# Patient Record
Sex: MALE | Race: WHITE | NOT HISPANIC OR LATINO | Employment: UNEMPLOYED | URBAN - NONMETROPOLITAN AREA
[De-identification: names, ages, dates, MRNs, and addresses within clinical notes are randomized per-mention and may not be internally consistent; named-entity substitution may affect disease eponyms.]

---

## 2023-06-13 PROBLEM — E55.9 VITAMIN D DEFICIENCY: Status: ACTIVE | Noted: 2023-06-13

## 2023-06-13 PROBLEM — G47.33 OSA (OBSTRUCTIVE SLEEP APNEA): Status: ACTIVE | Noted: 2023-06-13

## 2023-06-13 PROBLEM — I10 HYPERTENSION: Status: ACTIVE | Noted: 2023-06-13

## 2023-06-13 PROBLEM — E78.00 HYPERCHOLESTEREMIA: Status: ACTIVE | Noted: 2023-06-13

## 2023-06-13 PROBLEM — J44.1 COPD WITH EXACERBATION (MULTI): Status: ACTIVE | Noted: 2023-06-13

## 2023-06-13 PROBLEM — J84.10 PULMONARY FIBROSIS (MULTI): Status: ACTIVE | Noted: 2023-06-13

## 2023-06-13 PROBLEM — H40.9 GLAUCOMA: Status: ACTIVE | Noted: 2023-06-13

## 2023-06-13 RX ORDER — IPRATROPIUM BROMIDE AND ALBUTEROL SULFATE 2.5; .5 MG/3ML; MG/3ML
SOLUTION RESPIRATORY (INHALATION)
COMMUNITY
Start: 2019-06-03 | End: 2023-09-23 | Stop reason: SDUPTHER

## 2023-06-13 RX ORDER — ATORVASTATIN CALCIUM 80 MG/1
1 TABLET, FILM COATED ORAL DAILY
COMMUNITY
Start: 2016-10-13 | End: 2023-06-29 | Stop reason: SDUPTHER

## 2023-06-13 RX ORDER — ASPIRIN 81 MG/1
1 TABLET ORAL DAILY
COMMUNITY
Start: 2017-01-16

## 2023-06-13 RX ORDER — FLUTICASONE FUROATE AND VILANTEROL 100; 25 UG/1; UG/1
POWDER RESPIRATORY (INHALATION)
COMMUNITY
Start: 2019-06-03 | End: 2023-10-16 | Stop reason: WASHOUT

## 2023-06-13 RX ORDER — DORZOLAMIDE HYDROCHLORIDE AND TIMOLOL MALEATE 20; 5 MG/ML; MG/ML
SOLUTION/ DROPS OPHTHALMIC
COMMUNITY
Start: 2021-06-12

## 2023-06-13 RX ORDER — LISINOPRIL 10 MG/1
1 TABLET ORAL DAILY
COMMUNITY
Start: 2016-06-14 | End: 2023-06-29 | Stop reason: SDUPTHER

## 2023-06-13 RX ORDER — BISACODYL 5 MG
TABLET, DELAYED RELEASE (ENTERIC COATED) ORAL
COMMUNITY
Start: 2022-01-21 | End: 2023-09-23 | Stop reason: ALTCHOICE

## 2023-06-13 RX ORDER — MULTIVITAMIN
TABLET ORAL
COMMUNITY
Start: 2017-01-16

## 2023-06-13 RX ORDER — BRIMONIDINE TARTRATE AND TIMOLOL MALEATE 2; 5 MG/ML; MG/ML
SOLUTION OPHTHALMIC 2 TIMES DAILY
COMMUNITY
Start: 2020-10-28 | End: 2023-10-16 | Stop reason: WASHOUT

## 2023-06-13 RX ORDER — POLYETHYLENE GLYCOL 3350 17 G/17G
POWDER, FOR SOLUTION ORAL
COMMUNITY
Start: 2022-01-21 | End: 2023-10-16 | Stop reason: WASHOUT

## 2023-06-13 RX ORDER — ALBUTEROL SULFATE 90 UG/1
AEROSOL, METERED RESPIRATORY (INHALATION)
COMMUNITY
Start: 2021-07-22

## 2023-06-29 DIAGNOSIS — E78.00 HYPERCHOLESTEREMIA: ICD-10-CM

## 2023-06-29 DIAGNOSIS — I10 HYPERTENSION, UNSPECIFIED TYPE: ICD-10-CM

## 2023-06-29 RX ORDER — LISINOPRIL 10 MG/1
10 TABLET ORAL DAILY
Qty: 30 TABLET | Refills: 0 | Status: SHIPPED | OUTPATIENT
Start: 2023-06-29 | End: 2023-07-12 | Stop reason: SDUPTHER

## 2023-06-29 RX ORDER — ATORVASTATIN CALCIUM 80 MG/1
80 TABLET, FILM COATED ORAL DAILY
Qty: 30 TABLET | Refills: 0 | Status: SHIPPED | OUTPATIENT
Start: 2023-06-29 | End: 2023-07-12 | Stop reason: SDUPTHER

## 2023-07-12 ENCOUNTER — OFFICE VISIT (OUTPATIENT)
Dept: PRIMARY CARE | Facility: CLINIC | Age: 68
End: 2023-07-12
Payer: MEDICARE

## 2023-07-12 VITALS
OXYGEN SATURATION: 95 % | WEIGHT: 186.6 LBS | DIASTOLIC BLOOD PRESSURE: 90 MMHG | SYSTOLIC BLOOD PRESSURE: 128 MMHG | HEART RATE: 65 BPM | TEMPERATURE: 97.2 F | BODY MASS INDEX: 32.03 KG/M2

## 2023-07-12 DIAGNOSIS — I70.0 ATHEROSCLEROSIS OF AORTA (CMS-HCC): ICD-10-CM

## 2023-07-12 DIAGNOSIS — J44.1 COPD WITH EXACERBATION (MULTI): ICD-10-CM

## 2023-07-12 DIAGNOSIS — E78.00 HYPERCHOLESTEREMIA: ICD-10-CM

## 2023-07-12 DIAGNOSIS — R91.8 LUNG MASS: ICD-10-CM

## 2023-07-12 DIAGNOSIS — I10 HYPERTENSION, UNSPECIFIED TYPE: Primary | ICD-10-CM

## 2023-07-12 PROCEDURE — 1160F RVW MEDS BY RX/DR IN RCRD: CPT | Performed by: INTERNAL MEDICINE

## 2023-07-12 PROCEDURE — 99214 OFFICE O/P EST MOD 30 MIN: CPT | Performed by: INTERNAL MEDICINE

## 2023-07-12 PROCEDURE — 1159F MED LIST DOCD IN RCRD: CPT | Performed by: INTERNAL MEDICINE

## 2023-07-12 PROCEDURE — 3074F SYST BP LT 130 MM HG: CPT | Performed by: INTERNAL MEDICINE

## 2023-07-12 PROCEDURE — 3080F DIAST BP >= 90 MM HG: CPT | Performed by: INTERNAL MEDICINE

## 2023-07-12 RX ORDER — LISINOPRIL 10 MG/1
10 TABLET ORAL DAILY
Qty: 90 TABLET | Refills: 1 | Status: SHIPPED | OUTPATIENT
Start: 2023-07-12 | End: 2023-10-16 | Stop reason: SDUPTHER

## 2023-07-12 RX ORDER — ATORVASTATIN CALCIUM 80 MG/1
80 TABLET, FILM COATED ORAL DAILY
Qty: 90 TABLET | Refills: 1 | Status: SHIPPED | OUTPATIENT
Start: 2023-07-12 | End: 2023-10-16 | Stop reason: SDUPTHER

## 2023-07-12 ASSESSMENT — ENCOUNTER SYMPTOMS
ARTHRALGIAS: 0
HYPERTENSION: 1
DIFFICULTY URINATING: 0
ABDOMINAL PAIN: 0
HEADACHES: 0
FEVER: 0
COUGH: 0
SINUS PAIN: 0
PALPITATIONS: 0
BLOOD IN STOOL: 0
SORE THROAT: 0
FATIGUE: 0
WHEEZING: 0
DIARRHEA: 0
DIZZINESS: 0
SHORTNESS OF BREATH: 1
UNEXPECTED WEIGHT CHANGE: 0
BRUISES/BLEEDS EASILY: 0

## 2023-07-12 NOTE — PROGRESS NOTES
Subjective   Patient ID: Ozzy Hernández is a 67 y.o. male who presents for COPD, Hyperlipidemia, Hypertension, Results (CT and PET would like results), and Med Refill (Disability placard).    - Abnormal CT scan followed up by pulmonary consult recommendation for PET scan results showed abnormality highly suspicious for malignancy patient awaiting follow-up appointment in 2 weeks with Dr. Hussein  -COPD underlying lung fibrosis continue with current inhaler  - Recent blood work reviewed with patient hypomagnesemia need to continue magnesium supplement as recommended  -Hypoxemia continues 2 L oxygen as recommended follow-up pulmonary for further recommendation  -Obstructive sleep apnea counseled on sleep hygiene counseled about CPAP  -Hypercholesterolemia on Lipitor 80 mg continue monitoring follow-up lipid profile  -Hypertension improving continue his lisinopril 10 mg, compensated  Follow-up 3m months       Hyperlipidemia  Associated symptoms include shortness of breath. Pertinent negatives include no chest pain.   Hypertension  Associated symptoms include shortness of breath. Pertinent negatives include no chest pain, headaches or palpitations.   Med Refill  Pertinent negatives include no abdominal pain, arthralgias, chest pain, congestion, coughing, fatigue, fever, headaches, rash or sore throat.          Review of Systems   Constitutional:  Negative for fatigue, fever and unexpected weight change.   HENT:  Negative for congestion, ear discharge, ear pain, mouth sores, sinus pain and sore throat.    Eyes:  Negative for visual disturbance.   Respiratory:  Positive for shortness of breath. Negative for cough and wheezing.    Cardiovascular:  Negative for chest pain, palpitations and leg swelling.   Gastrointestinal:  Negative for abdominal pain, blood in stool and diarrhea.   Genitourinary:  Negative for difficulty urinating.   Musculoskeletal:  Negative for arthralgias.   Skin:  Negative for rash.  "  Neurological:  Negative for dizziness and headaches.   Hematological:  Does not bruise/bleed easily.   Psychiatric/Behavioral:  Negative for behavioral problems.    All other systems reviewed and are negative.      Objective   No results found for: \"HGBA1C\"   /90   Pulse 65   Temp 36.2 °C (97.2 °F)   Wt 84.6 kg (186 lb 9.6 oz)   SpO2 95%   BMI 32.03 kg/m²   Lab Results   Component Value Date    WBC 8.4 12/19/2022    HGB 15.9 12/19/2022    HCT 48.1 12/19/2022     12/19/2022    CHOL 113 12/20/2022    TRIG 116 12/20/2022    HDL 35.1 (A) 12/20/2022    ALT 74 (H) 12/19/2022    AST 50 (H) 12/19/2022     12/19/2022    K 4.1 12/19/2022     12/19/2022    CREATININE 0.94 12/19/2022    BUN 8 12/19/2022    CO2 28 12/19/2022    TSH 1.14 12/19/2022     par   Physical Exam  Vitals and nursing note reviewed.   Constitutional:       Appearance: Normal appearance.   HENT:      Head: Normocephalic.      Nose: Nose normal.   Eyes:      Conjunctiva/sclera: Conjunctivae normal.      Pupils: Pupils are equal, round, and reactive to light.   Cardiovascular:      Rate and Rhythm: Regular rhythm.   Pulmonary:      Effort: Pulmonary effort is normal.      Breath sounds: Wheezing present.   Abdominal:      General: Abdomen is flat.      Palpations: Abdomen is soft.   Musculoskeletal:      Cervical back: Neck supple.   Skin:     General: Skin is warm.   Neurological:      General: No focal deficit present.      Mental Status: He is oriented to person, place, and time.   Psychiatric:         Mood and Affect: Mood normal.         Assessment/Plan   Ozzy was seen today for copd, hyperlipidemia, hypertension, results and med refill.  Diagnoses and all orders for this visit:  Hypertension, unspecified type (Primary)  COPD with exacerbation (CMS/HCC)  -     Disability Placard  Hypercholesteremia  Atherosclerosis of aorta (CMS/HCC)  Lung mass   - Abnormal CT scan followed up by pulmonary consult recommendation for PET " scan results showed abnormality highly suspicious for malignancy patient awaiting follow-up appointment in 2 weeks with Dr. Hussein  -COPD underlying lung fibrosis continue with current inhaler  - Recent blood work reviewed with patient hypomagnesemia need to continue magnesium supplement as recommended  -Hypoxemia continues 2 L oxygen as recommended follow-up pulmonary for further recommendation  -Obstructive sleep apnea counseled on sleep hygiene counseled about CPAP  -Hypercholesterolemia on Lipitor 80 mg continue monitoring follow-up lipid profile  -Hypertension improving continue his lisinopril 10 mg, compensated  Follow-up 3m months

## 2023-09-20 ENCOUNTER — HOSPITAL ENCOUNTER (OUTPATIENT)
Dept: DATA CONVERSION | Facility: HOSPITAL | Age: 68
Discharge: HOME | End: 2023-09-20
Payer: MEDICARE

## 2023-09-20 DIAGNOSIS — R91.1 SOLITARY PULMONARY NODULE: ICD-10-CM

## 2023-09-23 PROBLEM — H35.30 MYOPIC MACULAR DEGENERATION: Status: ACTIVE | Noted: 2017-04-21

## 2023-09-23 PROBLEM — G47.9 SLEEP DISTURBANCE: Status: ACTIVE | Noted: 2023-09-23

## 2023-09-23 PROBLEM — M17.11 ARTHRITIS OF KNEE, RIGHT: Status: ACTIVE | Noted: 2023-09-23

## 2023-09-23 PROBLEM — R91.1 LUNG NODULE: Status: ACTIVE | Noted: 2023-09-23

## 2023-09-23 PROBLEM — E83.42 HYPOMAGNESEMIA: Status: ACTIVE | Noted: 2023-09-23

## 2023-09-23 PROBLEM — H04.123 CHRONICALLY DRY EYES, BILATERAL: Status: ACTIVE | Noted: 2018-10-12

## 2023-09-23 PROBLEM — H60.543 ECZEMA OF BOTH EXTERNAL EARS: Status: ACTIVE | Noted: 2023-09-23

## 2023-09-23 PROBLEM — R09.02 HYPOXIA: Status: ACTIVE | Noted: 2023-09-23

## 2023-09-23 PROBLEM — M75.81 TENDINITIS OF RIGHT ROTATOR CUFF: Status: ACTIVE | Noted: 2023-09-23

## 2023-09-23 PROBLEM — J96.11 CHRONIC RESPIRATORY FAILURE WITH HYPOXIA (MULTI): Status: ACTIVE | Noted: 2023-09-23

## 2023-09-23 PROBLEM — H40.1131 PRIMARY OPEN ANGLE GLAUCOMA OF BOTH EYES, MILD STAGE: Status: ACTIVE | Noted: 2017-04-21

## 2023-09-23 PROBLEM — F17.200 NICOTINE DEPENDENCE: Status: ACTIVE | Noted: 2023-09-23

## 2023-09-23 PROBLEM — J84.9 ILD (INTERSTITIAL LUNG DISEASE) (MULTI): Status: ACTIVE | Noted: 2023-09-23

## 2023-09-23 PROBLEM — R19.5 POSITIVE COLORECTAL CANCER SCREENING USING COLOGUARD TEST: Status: ACTIVE | Noted: 2023-09-23

## 2023-09-23 PROBLEM — R91.8 LUNG MASS: Status: ACTIVE | Noted: 2023-09-23

## 2023-09-23 PROBLEM — H52.10 HIGH MYOPIA: Status: ACTIVE | Noted: 2017-04-21

## 2023-09-23 PROBLEM — L60.9 NAIL ABNORMALITIES: Status: ACTIVE | Noted: 2023-09-23

## 2023-09-23 RX ORDER — LANOLIN ALCOHOL/MO/W.PET/CERES
1 CREAM (GRAM) TOPICAL 2 TIMES DAILY
COMMUNITY
Start: 2023-01-04 | End: 2023-10-16 | Stop reason: WASHOUT

## 2023-09-23 RX ORDER — IPRATROPIUM BROMIDE AND ALBUTEROL SULFATE 2.5; .5 MG/3ML; MG/3ML
3 SOLUTION RESPIRATORY (INHALATION) EVERY 4 HOURS PRN
COMMUNITY
Start: 2019-06-03

## 2023-09-23 RX ORDER — CALCIUM CARBONATE 300MG(750)
1 TABLET,CHEWABLE ORAL 2 TIMES DAILY
COMMUNITY
Start: 2023-01-04 | End: 2023-10-16 | Stop reason: WASHOUT

## 2023-09-25 ENCOUNTER — PATIENT OUTREACH (OUTPATIENT)
Dept: CARE COORDINATION | Facility: CLINIC | Age: 68
End: 2023-09-25
Payer: MEDICARE

## 2023-09-25 NOTE — PROGRESS NOTES
Outreach call to patient to support a smooth transition of care from recent admission.  Spoke with patient, reviewed discharge medications, discharge instructions, assessed social needs, and provided education on importance of follow-up appointment with provider. Enrolled patient in [facility] for additional support and education through transition period.    Patient states, he is doing ok. Patient states, his breathing is back to normal. Patient states, he wears his oxygen as needed.    Engagement  Call Start Time: 1311 (9/25/2023  1:11 PM)    Medications  Medications reviewed with patient/caregiver?: Yes (9/25/2023  1:11 PM)  Is the patient having any side effects they believe may be caused by any medication additions or changes?: No (9/25/2023  1:11 PM)  Does the patient have all medications ordered at discharge?: Yes (9/25/2023  1:11 PM)  Is the patient taking all medications as directed (includes completed medication regime)?: Yes (9/25/2023  1:11 PM)    Appointments  Does the patient have a primary care provider?: Yes (Scheduled for 10/16/2023) (9/25/2023  1:11 PM)  Care Management Interventions: Verified appointment date/time/provider; Educated patient on importance of making appointment (9/25/2023  1:11 PM)  Has the patient kept scheduled appointments due by today?: No (9/25/2023  1:11 PM)    Self Management  What is the home health agency?: not applicable (9/25/2023  1:11 PM)  What Durable Medical Equipment (DME) was ordered?: not applicable (9/25/2023  1:11 PM)    Patient Teaching  Does the patient have access to their discharge instructions?: Yes (9/25/2023  1:11 PM)  Care Management Interventions: Reviewed instructions with patient (9/25/2023  1:11 PM)  Is the patient/caregiver able to teach back the hierarchy of who to call/visit for symptoms/problems? PCP, Specialist, Home Health nurse, Urgent Care, ED, 911: Yes (9/25/2023  1:11 PM)    Will continue to monitor through transition period.  Andree  FLORENCIA Landeros

## 2023-10-09 RX ORDER — PREDNISONE 10 MG/1
TABLET ORAL
COMMUNITY
Start: 2023-09-23 | End: 2023-10-16 | Stop reason: WASHOUT

## 2023-10-10 ENCOUNTER — HOSPITAL ENCOUNTER (OUTPATIENT)
Dept: RADIOLOGY | Facility: HOSPITAL | Age: 68
Discharge: HOME | End: 2023-10-10
Payer: MEDICARE

## 2023-10-10 ENCOUNTER — OFFICE VISIT (OUTPATIENT)
Dept: CARDIAC SURGERY | Facility: CLINIC | Age: 68
End: 2023-10-10
Payer: MEDICARE

## 2023-10-10 VITALS
OXYGEN SATURATION: 90 % | DIASTOLIC BLOOD PRESSURE: 70 MMHG | HEIGHT: 65 IN | WEIGHT: 177.4 LBS | SYSTOLIC BLOOD PRESSURE: 102 MMHG | HEART RATE: 71 BPM | BODY MASS INDEX: 29.56 KG/M2

## 2023-10-10 DIAGNOSIS — R91.1 LUNG NODULE: ICD-10-CM

## 2023-10-10 DIAGNOSIS — R91.1 LUNG NODULE: Primary | ICD-10-CM

## 2023-10-10 PROCEDURE — 1159F MED LIST DOCD IN RCRD: CPT | Performed by: THORACIC SURGERY (CARDIOTHORACIC VASCULAR SURGERY)

## 2023-10-10 PROCEDURE — 4004F PT TOBACCO SCREEN RCVD TLK: CPT | Performed by: THORACIC SURGERY (CARDIOTHORACIC VASCULAR SURGERY)

## 2023-10-10 PROCEDURE — 1160F RVW MEDS BY RX/DR IN RCRD: CPT | Performed by: THORACIC SURGERY (CARDIOTHORACIC VASCULAR SURGERY)

## 2023-10-10 PROCEDURE — 1126F AMNT PAIN NOTED NONE PRSNT: CPT | Performed by: THORACIC SURGERY (CARDIOTHORACIC VASCULAR SURGERY)

## 2023-10-10 PROCEDURE — 99215 OFFICE O/P EST HI 40 MIN: CPT | Performed by: THORACIC SURGERY (CARDIOTHORACIC VASCULAR SURGERY)

## 2023-10-10 PROCEDURE — 71046 X-RAY EXAM CHEST 2 VIEWS: CPT

## 2023-10-10 PROCEDURE — 3074F SYST BP LT 130 MM HG: CPT | Performed by: THORACIC SURGERY (CARDIOTHORACIC VASCULAR SURGERY)

## 2023-10-10 PROCEDURE — 3078F DIAST BP <80 MM HG: CPT | Performed by: THORACIC SURGERY (CARDIOTHORACIC VASCULAR SURGERY)

## 2023-10-10 ASSESSMENT — LIFESTYLE VARIABLES
HAVE YOU OR SOMEONE ELSE BEEN INJURED AS A RESULT OF YOUR DRINKING: NO
HAS A RELATIVE, FRIEND, DOCTOR, OR ANOTHER HEALTH PROFESSIONAL EXPRESSED CONCERN ABOUT YOUR DRINKING OR SUGGESTED YOU CUT DOWN: NO
HOW OFTEN DURING THE LAST YEAR HAVE YOU HAD A FEELING OF GUILT OR REMORSE AFTER DRINKING: NEVER
HOW OFTEN DURING THE LAST YEAR HAVE YOU FAILED TO DO WHAT WAS NORMALLY EXPECTED FROM YOU BECAUSE OF DRINKING: NEVER
HOW OFTEN DURING THE LAST YEAR HAVE YOU BEEN UNABLE TO REMEMBER WHAT HAPPENED THE NIGHT BEFORE BECAUSE YOU HAD BEEN DRINKING: NEVER
AUDIT-C TOTAL SCORE: 2
HOW OFTEN DO YOU HAVE A DRINK CONTAINING ALCOHOL: 2-4 TIMES A MONTH
HOW OFTEN DO YOU HAVE SIX OR MORE DRINKS ON ONE OCCASION: NEVER
AUDIT TOTAL SCORE: 2
SKIP TO QUESTIONS 9-10: 1
HOW OFTEN DURING THE LAST YEAR HAVE YOU NEEDED AN ALCOHOLIC DRINK FIRST THING IN THE MORNING TO GET YOURSELF GOING AFTER A NIGHT OF HEAVY DRINKING: NEVER
HOW OFTEN DURING THE LAST YEAR HAVE YOU FOUND THAT YOU WERE NOT ABLE TO STOP DRINKING ONCE YOU HAD STARTED: NEVER
HOW MANY STANDARD DRINKS CONTAINING ALCOHOL DO YOU HAVE ON A TYPICAL DAY: 1 OR 2

## 2023-10-10 ASSESSMENT — ENCOUNTER SYMPTOMS
UNEXPECTED WEIGHT CHANGE: 0
VOMITING: 0
OCCASIONAL FEELINGS OF UNSTEADINESS: 0
CHILLS: 0
EYES NEGATIVE: 1
NEUROLOGICAL NEGATIVE: 1
STRIDOR: 0
SHORTNESS OF BREATH: 0
NAUSEA: 0
FEVER: 0
ABDOMINAL DISTENTION: 0
COUGH: 0
WHEEZING: 0
MUSCULOSKELETAL NEGATIVE: 1
PALPITATIONS: 0
DIAPHORESIS: 0
CONSTIPATION: 0
ALLERGIC/IMMUNOLOGIC NEGATIVE: 1
DIARRHEA: 0
CHEST TIGHTNESS: 0
CHOKING: 0
LOSS OF SENSATION IN FEET: 0
ABDOMINAL PAIN: 0
PSYCHIATRIC NEGATIVE: 1
ENDOCRINE NEGATIVE: 1
APPETITE CHANGE: 0
FATIGUE: 0
HEMATOLOGIC/LYMPHATIC NEGATIVE: 1
DEPRESSION: 0

## 2023-10-10 ASSESSMENT — PAIN SCALES - GENERAL: PAINLEVEL: 0-NO PAIN

## 2023-10-10 ASSESSMENT — PATIENT HEALTH QUESTIONNAIRE - PHQ9
2. FEELING DOWN, DEPRESSED OR HOPELESS: NOT AT ALL
1. LITTLE INTEREST OR PLEASURE IN DOING THINGS: NOT AT ALL
SUM OF ALL RESPONSES TO PHQ9 QUESTIONS 1 AND 2: 0

## 2023-10-10 NOTE — PROGRESS NOTES
Subjective   Patient ID: Ozzy Hernández is a 68 y.o. male who presents for Follow-up (F/U from biopsy).  HPI  67-year-old male with history of interstitial lung disease and a left lower lobe nodule found on CT scan he uses oxygen at home 2 to 3 L.  PFTs last year in February showing an FEV1 of 70% but DLCO of 35%.  He underwent an IR guided biopsy that showed chronic inflammation with pneumonitis but no evidence of malignancy.  Just from the IR guided biopsy he will require an admission to the hospital for respiratory issues.  He recovered well after this.  I strongly believe that he is not a surgical candidate in any way.  I recommend continued follow-up with his nodule.  And if it continues to increase in size attempt to rebiopsy.  Review of Systems   Constitutional:  Negative for appetite change, chills, diaphoresis, fatigue, fever and unexpected weight change.   HENT: Negative.     Eyes: Negative.    Respiratory:  Negative for cough, choking, chest tightness, shortness of breath, wheezing and stridor.    Cardiovascular:  Negative for chest pain, palpitations and leg swelling.   Gastrointestinal:  Negative for abdominal distention, abdominal pain, constipation, diarrhea, nausea and vomiting.   Endocrine: Negative.    Genitourinary: Negative.    Musculoskeletal: Negative.    Skin: Negative.    Allergic/Immunologic: Negative.    Neurological: Negative.    Hematological: Negative.    Psychiatric/Behavioral: Negative.     All other systems reviewed and are negative.      Objective   Physical Exam  Constitutional:       Appearance: Normal appearance.   HENT:      Head: Normocephalic and atraumatic.      Nose: Nose normal.      Mouth/Throat:      Mouth: Mucous membranes are moist.      Pharynx: Oropharynx is clear.   Eyes:      Extraocular Movements: Extraocular movements intact.      Conjunctiva/sclera: Conjunctivae normal.      Pupils: Pupils are equal, round, and reactive to light.   Cardiovascular:      Rate and  Rhythm: Normal rate and regular rhythm.      Pulses: Normal pulses.      Heart sounds: Normal heart sounds.   Pulmonary:      Effort: Pulmonary effort is normal. No respiratory distress.      Breath sounds: Normal breath sounds. No stridor. No wheezing, rhonchi or rales.   Chest:      Chest wall: No tenderness.   Abdominal:      General: Abdomen is flat. Bowel sounds are normal.      Palpations: Abdomen is soft.   Musculoskeletal:         General: Normal range of motion.      Cervical back: Normal range of motion and neck supple.   Skin:     General: Skin is warm and dry.      Capillary Refill: Capillary refill takes less than 2 seconds.   Neurological:      General: No focal deficit present.      Mental Status: He is alert and oriented to person, place, and time.         Assessment/Plan   Diagnoses and all orders for this visit:  Lung nodule  Recommend CT scan in 3 months and continue close follow-up.  Follow-up with me as needed.    Kendall Romeo MD  Thoracic and Esophageal Surgery

## 2023-10-16 ENCOUNTER — OFFICE VISIT (OUTPATIENT)
Dept: PRIMARY CARE | Facility: CLINIC | Age: 68
End: 2023-10-16
Payer: MEDICARE

## 2023-10-16 VITALS
TEMPERATURE: 96.8 F | OXYGEN SATURATION: 92 % | HEART RATE: 67 BPM | SYSTOLIC BLOOD PRESSURE: 122 MMHG | DIASTOLIC BLOOD PRESSURE: 80 MMHG | WEIGHT: 175.6 LBS | BODY MASS INDEX: 29.22 KG/M2

## 2023-10-16 DIAGNOSIS — J96.11 CHRONIC RESPIRATORY FAILURE WITH HYPOXIA (MULTI): ICD-10-CM

## 2023-10-16 DIAGNOSIS — J44.1 COPD WITH EXACERBATION (MULTI): Primary | ICD-10-CM

## 2023-10-16 DIAGNOSIS — R91.8 LUNG MASS: ICD-10-CM

## 2023-10-16 DIAGNOSIS — E78.00 HYPERCHOLESTEREMIA: ICD-10-CM

## 2023-10-16 DIAGNOSIS — F17.218 CIGARETTE NICOTINE DEPENDENCE WITH OTHER NICOTINE-INDUCED DISORDER: ICD-10-CM

## 2023-10-16 DIAGNOSIS — I10 HYPERTENSION, UNSPECIFIED TYPE: ICD-10-CM

## 2023-10-16 DIAGNOSIS — Z00.00 ANNUAL PHYSICAL EXAM: ICD-10-CM

## 2023-10-16 PROCEDURE — 4004F PT TOBACCO SCREEN RCVD TLK: CPT | Performed by: INTERNAL MEDICINE

## 2023-10-16 PROCEDURE — 3079F DIAST BP 80-89 MM HG: CPT | Performed by: INTERNAL MEDICINE

## 2023-10-16 PROCEDURE — 3074F SYST BP LT 130 MM HG: CPT | Performed by: INTERNAL MEDICINE

## 2023-10-16 PROCEDURE — 99406 BEHAV CHNG SMOKING 3-10 MIN: CPT | Performed by: INTERNAL MEDICINE

## 2023-10-16 PROCEDURE — 99214 OFFICE O/P EST MOD 30 MIN: CPT | Performed by: INTERNAL MEDICINE

## 2023-10-16 PROCEDURE — 1159F MED LIST DOCD IN RCRD: CPT | Performed by: INTERNAL MEDICINE

## 2023-10-16 PROCEDURE — 1126F AMNT PAIN NOTED NONE PRSNT: CPT | Performed by: INTERNAL MEDICINE

## 2023-10-16 PROCEDURE — 1160F RVW MEDS BY RX/DR IN RCRD: CPT | Performed by: INTERNAL MEDICINE

## 2023-10-16 RX ORDER — ATORVASTATIN CALCIUM 80 MG/1
80 TABLET, FILM COATED ORAL DAILY
Qty: 90 TABLET | Refills: 1 | Status: SHIPPED | OUTPATIENT
Start: 2023-10-16 | End: 2024-01-16 | Stop reason: SDUPTHER

## 2023-10-16 RX ORDER — LISINOPRIL 10 MG/1
10 TABLET ORAL DAILY
Qty: 90 TABLET | Refills: 1 | Status: SHIPPED | OUTPATIENT
Start: 2023-10-16 | End: 2024-01-16 | Stop reason: SDUPTHER

## 2023-10-16 RX ORDER — DOXYCYCLINE 100 MG/1
100 CAPSULE ORAL 2 TIMES DAILY
Qty: 28 CAPSULE | Refills: 0 | Status: SHIPPED | OUTPATIENT
Start: 2023-10-16 | End: 2023-10-30

## 2023-10-16 ASSESSMENT — ENCOUNTER SYMPTOMS
BRUISES/BLEEDS EASILY: 0
DIARRHEA: 0
PALPITATIONS: 0
HYPERTENSION: 1
DIFFICULTY URINATING: 0
UNEXPECTED WEIGHT CHANGE: 0
FEVER: 0
FATIGUE: 0
BLOOD IN STOOL: 0
ABDOMINAL PAIN: 0
DIZZINESS: 0
SORE THROAT: 0
SINUS PAIN: 0
ARTHRALGIAS: 0
COUGH: 1
HEADACHES: 0
WHEEZING: 1

## 2023-10-16 NOTE — PROGRESS NOTES
"Subjective   Patient ID: Ozzy Hernández is a 68 y.o. male who presents for Hypertension and Hyperlipidemia.    - Abnormal CT scan followed up by pulmonary consult recommendation for PET scan results showed abnormality highly suspicious for malignancy patient seen by cardiothoracic surgery underwent lung biopsy infectious process started on prednisone  Patient need to start on doxycycline twice a day for 14 days  Follow-up pulmonary Dr. Hussein in 2 weeks as recommended  - Nicotine dependency  \"I spent more3  minutes counseling patient about need for smoking/tobacco cessation and how I can support efforts when patient is ready to quit.  Discussed nicotine replacement therapy, Varenicline, Bupropion, hypnosis, support groups, and accupunture as potential options.  Patient currently has no signs or symptoms of tobacco related disease.\".  - COPD exacerbation-  Continue with doxycycline as recommended, underlying lung fibrosis continue with current inhaler  -Obstructive sleep apnea counseled on sleep hygiene counseled about CPAP  -Hypercholesterolemia on Lipitor 80 mg continue monitoring follow-up lipid profile  -Hypertension improving continue his lisinopril 10 mg, compensated  Needs to complete blood work Medicare physical next appointment  Follow-up 3m months       Hypertension  Pertinent negatives include no chest pain, headaches or palpitations.   Hyperlipidemia  Pertinent negatives include no chest pain.          Review of Systems   Constitutional:  Negative for fatigue, fever and unexpected weight change.   HENT:  Negative for congestion, ear discharge, ear pain, mouth sores, sinus pain and sore throat.    Eyes:  Negative for visual disturbance.   Respiratory:  Positive for cough and wheezing.    Cardiovascular:  Negative for chest pain, palpitations and leg swelling.   Gastrointestinal:  Negative for abdominal pain, blood in stool and diarrhea.   Genitourinary:  Negative for difficulty urinating. " "  Musculoskeletal:  Negative for arthralgias.   Skin:  Negative for rash.   Neurological:  Negative for dizziness and headaches.   Hematological:  Does not bruise/bleed easily.   Psychiatric/Behavioral:  Negative for behavioral problems.    All other systems reviewed and are negative.      Objective   No results found for: \"HGBA1C\"   /80   Pulse 67   Temp 36 °C (96.8 °F)   Wt 79.7 kg (175 lb 9.6 oz)   SpO2 92%   BMI 29.22 kg/m²   Lab Results   Component Value Date    WBC 15.7 (H) 09/23/2023    HGB 13.6 09/23/2023    HCT 39.8 (L) 09/23/2023     09/23/2023    CHOL 113 12/20/2022    TRIG 116 12/20/2022    HDL 35.1 (A) 12/20/2022    ALT 74 (H) 12/19/2022    AST 50 (H) 12/19/2022     09/23/2023    K 4.5 09/23/2023     09/23/2023    CREATININE 0.8 09/23/2023    BUN 13 09/23/2023    CO2 23 (L) 09/23/2023    TSH 1.14 12/19/2022    INR 1.1 09/21/2023     par   Physical Exam  Vitals and nursing note reviewed.   Constitutional:       Appearance: Normal appearance.   HENT:      Head: Normocephalic.      Nose: Nose normal.   Eyes:      Conjunctiva/sclera: Conjunctivae normal.      Pupils: Pupils are equal, round, and reactive to light.   Cardiovascular:      Rate and Rhythm: Regular rhythm.   Pulmonary:      Effort: Pulmonary effort is normal.      Breath sounds: Wheezing and rhonchi present.   Abdominal:      General: Abdomen is flat.      Palpations: Abdomen is soft.   Musculoskeletal:      Cervical back: Neck supple.      Right lower leg: No edema.      Left lower leg: No edema.   Skin:     General: Skin is warm.   Neurological:      General: No focal deficit present.      Mental Status: He is oriented to person, place, and time.   Psychiatric:         Mood and Affect: Mood normal.         Assessment/Plan   Ozzy was seen today for hypertension and hyperlipidemia.  Diagnoses and all orders for this visit:  COPD with exacerbation (CMS/HCC) (Primary)  -     doxycycline (Vibramycin) 100 mg capsule; " "Take 1 capsule (100 mg) by mouth 2 times a day for 14 days. Take with at least 8 ounces (large glass) of water, do not lie down for 30 minutes after  -     CBC and Auto Differential; Future  Hypercholesteremia  -     atorvastatin (Lipitor) 80 mg tablet; Take 1 tablet (80 mg) by mouth once daily.  Hypertension, unspecified type  -     lisinopril 10 mg tablet; Take 1 tablet (10 mg) by mouth once daily.  Chronic respiratory failure with hypoxia (CMS/HCC)  -     CBC and Auto Differential; Future  Lung mass  Cigarette nicotine dependence with other nicotine-induced disorder  Annual physical exam  -     CBC and Auto Differential; Future  -     Comprehensive Metabolic Panel; Future  -     Lipid Panel; Future  -     Prostate Specific Antigen; Future  -     Magnesium; Future  -     TSH with reflex to Free T4 if abnormal; Future  Other orders  -     Follow Up In Primary Care - Medicare Annual; Future    Abnormal CT scan followed up by pulmonary consult recommendation for PET scan results showed abnormality highly suspicious for malignancy patient seen by cardiothoracic surgery underwent lung biopsy infectious process started on prednisone  Patient need to start on doxycycline twice a day for 14 days  Follow-up pulmonary Dr. Hussein in 2 weeks as recommended  - Nicotine dependency  \"I spent more3  minutes counseling patient about need for smoking/tobacco cessation and how I can support efforts when patient is ready to quit.  Discussed nicotine replacement therapy, Varenicline, Bupropion, hypnosis, support groups, and accupunture as potential options.  Patient currently has no signs or symptoms of tobacco related disease.\".  - COPD exacerbation-  Continue with doxycycline as recommended, underlying lung fibrosis continue with current inhaler  -Obstructive sleep apnea counseled on sleep hygiene counseled about CPAP  -Hypercholesterolemia on Lipitor 80 mg continue monitoring follow-up lipid profile  -Hypertension improving " continue his lisinopril 10 mg, compensated  Needs to complete blood work Medicare physical next appointment  Follow-up 3m months

## 2023-10-23 ENCOUNTER — OFFICE VISIT (OUTPATIENT)
Dept: PULMONOLOGY | Facility: CLINIC | Age: 68
End: 2023-10-23
Payer: MEDICARE

## 2023-10-23 VITALS
DIASTOLIC BLOOD PRESSURE: 78 MMHG | WEIGHT: 179.4 LBS | HEART RATE: 66 BPM | SYSTOLIC BLOOD PRESSURE: 124 MMHG | OXYGEN SATURATION: 91 % | BODY MASS INDEX: 29.85 KG/M2

## 2023-10-23 DIAGNOSIS — J84.10 PULMONARY FIBROSIS (MULTI): ICD-10-CM

## 2023-10-23 DIAGNOSIS — R91.8 LUNG MASS: Primary | ICD-10-CM

## 2023-10-23 DIAGNOSIS — J44.1 COPD WITH EXACERBATION (MULTI): ICD-10-CM

## 2023-10-23 PROCEDURE — 3078F DIAST BP <80 MM HG: CPT | Performed by: PEDIATRICS

## 2023-10-23 PROCEDURE — 1159F MED LIST DOCD IN RCRD: CPT | Performed by: PEDIATRICS

## 2023-10-23 PROCEDURE — 1160F RVW MEDS BY RX/DR IN RCRD: CPT | Performed by: PEDIATRICS

## 2023-10-23 PROCEDURE — 99215 OFFICE O/P EST HI 40 MIN: CPT | Performed by: PEDIATRICS

## 2023-10-23 PROCEDURE — 1126F AMNT PAIN NOTED NONE PRSNT: CPT | Performed by: PEDIATRICS

## 2023-10-23 PROCEDURE — 3074F SYST BP LT 130 MM HG: CPT | Performed by: PEDIATRICS

## 2023-10-23 PROCEDURE — 4004F PT TOBACCO SCREEN RCVD TLK: CPT | Performed by: PEDIATRICS

## 2023-10-23 NOTE — PROGRESS NOTES
"Subjective   Patient ID: Ozzy Hernández is a 68 y.o. male who presents for lung mass, hypoxia, COPD    HPI    Mr. Hernández is a 68 y.o man, smoker (1 PPD since age 16), being evaluated for ILD.     PCP: Dr. Mariajose Parish    HPI:  10/01/2018: At baseline, he denies any dyspnea on exertion or at rest. He is active in her everyday life and carries loads and does strenuous exercise. He is only troubled by breathlessness except on strenuous exercise (mMRC 0) His CAT score is <10. He also denies orthopnea, pnd, or jose roberto. His weight has been mostly stable. He also relates chronic cough, mostly dry but occasionally with clear or green sputum. No hemoptysis. No fever or shivering chills. He has no runny nose, or a tingling sensation in the back of his throat. He denies chest pain or heartburn. [place] score (ESS) is 2.    10/17/2018: Since the last visit, patient's breathing is mostly unchanged. No new cough, sputum, f/c/ns. CAT 10. Had 6MWT and labs done (results below).     04/15/2019: Since the last visit, patient's breathing is mostly unchanged. He saw Dr. Casper for possible VATS but decline biopsy. He continues to be short of breath on exertion mMRC 2. stable cough with minimal sputum. Had a repeat CT scan done (results below).     06/03/2019: CAT 7. Since the last visit, patient's breathing is mostly unchanged. Thinks his cough is worse mostly at night. Continues to smoke. Not using any inhalers because he can't afford them. Had repeat PFTs, 6MWT, repeat CT and echo done (results below).       7/22/2021: Mr Hernández is here for follow up. He has not been seen since 2019. He has no inhalers and only wears oxygen \"when I need it\". He feels about at baseline. He is still not interested in getting a lung biopsy. He does want an inogen POC.     3/2/2022: Mr Hernández is doing ok. He is using breo daily. He had a PFT which shows mild obstruction, mild restriction and severe reduction in diffusing capacity. He had a 6mwt " test with oxygen titration and requires 1-2 lpm to maintain adequate oxygen saturation.    6/15/2022: Mr Hernández is doing ok, he is using breo daily and rarely needs albuterol. He is wearing oxygen continuously and would like a POC, [facility] has only supplied small tanks currently.    7/27/2023: Mr Hernández feels about the same (at baseline), he is using breo and duoneb or albuterol as needed. He had a repeat CT which shows a 3.3 x 2.6cm LLL lung mass. He then had a PET scan and it shows an SUV of 4.8.     10/23/2023:  Mr Hernández wsas evaluated by CT surgery and found to not be a candidate for surgical resection of the LLL  lung mass.  He did get a CT guided biopsy and that showed inflammatory cells.  HE also had a PFT done which shows mild/moderate obstruction and moderate restriction with significantly low DLCO.  He tried to get a POC (order written last visit) but he has to use tanks consistently for 3 months before insurance will allow him to get a POC.  He is not interested in doing this.      Previous pulmonary history:   He has of ? Asthma and recurrent bronchitis as a child. He was previously told he has COPD but did not have PFTs prior to today. He currently is on no supplemental oxygen. He has never been to pulmonary rehab. Does not recall having AECOPD requiring antibiotics or prednisone.    Inhalers/nebulized medications: only prn pro-air      Sleep history:  Complains of snoring, Fiance witnesses him having apneas frequently at night. Does not feel tired during the day or take frequent naps.  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 No known exposure to asbestos, silica or beryllium. 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      Family History:  No family history of lung  diseases or cancer     Imaging history: (I have personally reviewed the imaging below)  04/03/2019 CT with decrease in the size of left basilar pulm nodule now measure 1.4cm. Stable fibrosis with honeycombing.   05/08/2018: CT chest (compared to 03/2018) subpleural reticulation with GGO opacification without honeycombing or traction bronchiolectasis consistent with indeterminate pattern. Mild emphysema. Stable nodules largest 5m and 4.2 cm opacity at the left lower lobe  PET in 03/2018 -> negative      PFTs:   05/08/2019 -> Ratio of 0.66/FEV1 2.27L (77%) (no BD response)/FVC 3.43L (87%)/TLC 76%/RVtoTLC ratio 0.28/DLCO 38->68%  09/28/2018 -> FEV1/FVC ratio 0.72/FEV1 2.5L (84%)(no BD response)/FVC 3.47L (88.4%)/DLCO 41->58%/TLC 80%/RV to TLC ratio 0.31  8/31/2023: mild/moderate obstruction (FEV1 70%) moderate restriction (TLC 66%). Low DLCO (30%)     6 MWTs:   05/08/2019 -> on RA, 245m desaturation from 98 -> 93%  10/17/2018 -> On RA, 359m with desaturation from 98 -> 93%     Lung biopsy: None on record     Echo:  05/08/2019-> Normal EF, diastolic dysfunction, with normal LA, RV size and function      Labs:  08/2018 Normal Hb, no eosinophilia, normal kidney function test, mild transaminitis    Review of Systems    See scanned documents attached to this note for review of systems, and appropriate scales/scores for this visit.     Objective   Physical Exam  Constitutional:       Appearance: Normal appearance.   HENT:      Head: Normocephalic and atraumatic.      Mouth/Throat:      Pharynx: Oropharynx is clear.   Cardiovascular:      Rate and Rhythm: Normal rate and regular rhythm.      Pulses: Normal pulses.      Heart sounds: Normal heart sounds.   Pulmonary:      Effort: Pulmonary effort is normal.      Breath sounds: No wheezing, rhonchi or rales.      Comments: Course breath sounds with crackles and rhonchi (baseline)  Abdominal:      General: Bowel sounds are normal.      Palpations: Abdomen is soft.    Musculoskeletal:         General: Normal range of motion.   Skin:     General: Skin is warm and dry.   Neurological:      General: No focal deficit present.      Mental Status: He is alert and oriented to person, place, and time.   Psychiatric:         Mood and Affect: Mood normal.             Assessment/Plan     Mr. Hernández is a 67 y.o man, smoker (1 PPD since age 16) with pulmonary fibrosis and COPD with hypoxia and lung mass concerning for malignancy    # Pulmonary fibrosis: not interested in Bx or further work up   - yearly PFT  ([facility] 2024)      # COPD with emphysema:  - continue breo, duoneb and albuterol HFA     # hypoxia: supposed to be on 2lpm continuously.  - encouraged oxygen use  - POC will not be covered until patient uses tanks consistently for 3 months.     # Lung mass:   - biopsy shows inflammation  - repeat CT in 3 months (January)     #Smoking:  - encouraged cessation    f/u after CT

## 2023-11-06 ENCOUNTER — PATIENT OUTREACH (OUTPATIENT)
Dept: CARE COORDINATION | Facility: CLINIC | Age: 68
End: 2023-11-06
Payer: MEDICARE

## 2023-11-06 NOTE — PROGRESS NOTES
Outreach call to patient to check in 30 days after hospital discharge to support smooth transition of care.  Patient with no additional needs noted. No additional outreach needed at this time.   Andree Landeros RN

## 2023-12-29 ENCOUNTER — LAB (OUTPATIENT)
Dept: LAB | Facility: LAB | Age: 68
End: 2023-12-29
Payer: MEDICARE

## 2023-12-29 DIAGNOSIS — Z00.00 ANNUAL PHYSICAL EXAM: ICD-10-CM

## 2023-12-29 DIAGNOSIS — J96.11 CHRONIC RESPIRATORY FAILURE WITH HYPOXIA (MULTI): ICD-10-CM

## 2023-12-29 DIAGNOSIS — J44.1 COPD WITH EXACERBATION (MULTI): ICD-10-CM

## 2023-12-29 LAB
ALBUMIN SERPL BCP-MCNC: 4.2 G/DL (ref 3.4–5)
ALP SERPL-CCNC: 97 U/L (ref 33–136)
ALT SERPL W P-5'-P-CCNC: 57 U/L (ref 10–52)
ANION GAP SERPL CALC-SCNC: 13 MMOL/L (ref 10–20)
AST SERPL W P-5'-P-CCNC: 41 U/L (ref 9–39)
BASOPHILS # BLD AUTO: 0.07 X10*3/UL (ref 0–0.1)
BASOPHILS NFR BLD AUTO: 0.6 %
BILIRUB SERPL-MCNC: 0.7 MG/DL (ref 0–1.2)
BUN SERPL-MCNC: 8 MG/DL (ref 6–23)
CALCIUM SERPL-MCNC: 9.5 MG/DL (ref 8.6–10.3)
CHLORIDE SERPL-SCNC: 101 MMOL/L (ref 98–107)
CHOLEST SERPL-MCNC: 115 MG/DL (ref 0–199)
CHOLESTEROL/HDL RATIO: 3
CO2 SERPL-SCNC: 27 MMOL/L (ref 21–32)
CREAT SERPL-MCNC: 0.9 MG/DL (ref 0.5–1.3)
EOSINOPHIL # BLD AUTO: 0.24 X10*3/UL (ref 0–0.7)
EOSINOPHIL NFR BLD AUTO: 2.1 %
ERYTHROCYTE [DISTWIDTH] IN BLOOD BY AUTOMATED COUNT: 13.2 % (ref 11.5–14.5)
GFR SERPL CREATININE-BSD FRML MDRD: >90 ML/MIN/1.73M*2
GLUCOSE SERPL-MCNC: 121 MG/DL (ref 74–99)
HCT VFR BLD AUTO: 48.3 % (ref 41–52)
HDLC SERPL-MCNC: 38.8 MG/DL
HGB BLD-MCNC: 16.2 G/DL (ref 13.5–17.5)
IMM GRANULOCYTES # BLD AUTO: 0.05 X10*3/UL (ref 0–0.7)
IMM GRANULOCYTES NFR BLD AUTO: 0.4 % (ref 0–0.9)
LDLC SERPL CALC-MCNC: 53 MG/DL
LYMPHOCYTES # BLD AUTO: 3.91 X10*3/UL (ref 1.2–4.8)
LYMPHOCYTES NFR BLD AUTO: 34.5 %
MAGNESIUM SERPL-MCNC: 1.73 MG/DL (ref 1.6–2.4)
MCH RBC QN AUTO: 32.9 PG (ref 26–34)
MCHC RBC AUTO-ENTMCNC: 33.5 G/DL (ref 32–36)
MCV RBC AUTO: 98 FL (ref 80–100)
MONOCYTES # BLD AUTO: 1.15 X10*3/UL (ref 0.1–1)
MONOCYTES NFR BLD AUTO: 10.2 %
NEUTROPHILS # BLD AUTO: 5.91 X10*3/UL (ref 1.2–7.7)
NEUTROPHILS NFR BLD AUTO: 52.2 %
NON HDL CHOLESTEROL: 76 MG/DL (ref 0–149)
NRBC BLD-RTO: 0 /100 WBCS (ref 0–0)
PLATELET # BLD AUTO: 258 X10*3/UL (ref 150–450)
POTASSIUM SERPL-SCNC: 4.3 MMOL/L (ref 3.5–5.3)
PROT SERPL-MCNC: 7.3 G/DL (ref 6.4–8.2)
RBC # BLD AUTO: 4.93 X10*6/UL (ref 4.5–5.9)
SODIUM SERPL-SCNC: 137 MMOL/L (ref 136–145)
TRIGL SERPL-MCNC: 116 MG/DL (ref 0–149)
TSH SERPL-ACNC: 0.87 MIU/L (ref 0.44–3.98)
VLDL: 23 MG/DL (ref 0–40)
WBC # BLD AUTO: 11.3 X10*3/UL (ref 4.4–11.3)

## 2023-12-29 PROCEDURE — 80061 LIPID PANEL: CPT

## 2023-12-29 PROCEDURE — 84153 ASSAY OF PSA TOTAL: CPT

## 2023-12-29 PROCEDURE — 36415 COLL VENOUS BLD VENIPUNCTURE: CPT

## 2023-12-29 PROCEDURE — 84443 ASSAY THYROID STIM HORMONE: CPT

## 2023-12-29 PROCEDURE — 83735 ASSAY OF MAGNESIUM: CPT

## 2023-12-29 PROCEDURE — 80053 COMPREHEN METABOLIC PANEL: CPT

## 2023-12-29 PROCEDURE — 85025 COMPLETE CBC W/AUTO DIFF WBC: CPT

## 2023-12-30 LAB — PSA SERPL-MCNC: 0.4 NG/ML

## 2024-01-16 ENCOUNTER — OFFICE VISIT (OUTPATIENT)
Dept: PRIMARY CARE | Facility: CLINIC | Age: 69
End: 2024-01-16
Payer: MEDICARE

## 2024-01-16 VITALS
WEIGHT: 178.8 LBS | BODY MASS INDEX: 29.79 KG/M2 | OXYGEN SATURATION: 96 % | HEIGHT: 65 IN | TEMPERATURE: 97.2 F | HEART RATE: 66 BPM | DIASTOLIC BLOOD PRESSURE: 74 MMHG | SYSTOLIC BLOOD PRESSURE: 110 MMHG

## 2024-01-16 DIAGNOSIS — I10 HYPERTENSION, UNSPECIFIED TYPE: ICD-10-CM

## 2024-01-16 DIAGNOSIS — Z11.59 NEED FOR HEPATITIS C SCREENING TEST: ICD-10-CM

## 2024-01-16 DIAGNOSIS — Z00.00 ROUTINE GENERAL MEDICAL EXAMINATION AT HEALTH CARE FACILITY: ICD-10-CM

## 2024-01-16 DIAGNOSIS — Z13.6 SCREENING FOR AAA (ABDOMINAL AORTIC ANEURYSM): ICD-10-CM

## 2024-01-16 DIAGNOSIS — F17.218 CIGARETTE NICOTINE DEPENDENCE WITH OTHER NICOTINE-INDUCED DISORDER: ICD-10-CM

## 2024-01-16 DIAGNOSIS — L60.9 NAIL ABNORMALITIES: ICD-10-CM

## 2024-01-16 DIAGNOSIS — R19.5 POSITIVE COLORECTAL CANCER SCREENING USING COLOGUARD TEST: ICD-10-CM

## 2024-01-16 DIAGNOSIS — E78.00 HYPERCHOLESTEREMIA: Primary | ICD-10-CM

## 2024-01-16 DIAGNOSIS — J84.10 PULMONARY FIBROSIS (MULTI): ICD-10-CM

## 2024-01-16 DIAGNOSIS — I70.0 ATHEROSCLEROSIS OF AORTA (CMS-HCC): ICD-10-CM

## 2024-01-16 DIAGNOSIS — R74.8 ABNORMAL LIVER ENZYMES: ICD-10-CM

## 2024-01-16 DIAGNOSIS — J96.11 CHRONIC RESPIRATORY FAILURE WITH HYPOXIA (MULTI): ICD-10-CM

## 2024-01-16 DIAGNOSIS — Z13.6 SCREENING FOR CARDIOVASCULAR CONDITION: ICD-10-CM

## 2024-01-16 DIAGNOSIS — J44.1 COPD WITH EXACERBATION (MULTI): ICD-10-CM

## 2024-01-16 PROCEDURE — 99214 OFFICE O/P EST MOD 30 MIN: CPT | Performed by: INTERNAL MEDICINE

## 2024-01-16 PROCEDURE — 3074F SYST BP LT 130 MM HG: CPT | Performed by: INTERNAL MEDICINE

## 2024-01-16 PROCEDURE — G0439 PPPS, SUBSEQ VISIT: HCPCS | Performed by: INTERNAL MEDICINE

## 2024-01-16 PROCEDURE — 1159F MED LIST DOCD IN RCRD: CPT | Performed by: INTERNAL MEDICINE

## 2024-01-16 PROCEDURE — 1160F RVW MEDS BY RX/DR IN RCRD: CPT | Performed by: INTERNAL MEDICINE

## 2024-01-16 PROCEDURE — 1126F AMNT PAIN NOTED NONE PRSNT: CPT | Performed by: INTERNAL MEDICINE

## 2024-01-16 PROCEDURE — 99406 BEHAV CHNG SMOKING 3-10 MIN: CPT | Performed by: INTERNAL MEDICINE

## 2024-01-16 PROCEDURE — 1170F FXNL STATUS ASSESSED: CPT | Performed by: INTERNAL MEDICINE

## 2024-01-16 PROCEDURE — 99397 PER PM REEVAL EST PAT 65+ YR: CPT | Performed by: INTERNAL MEDICINE

## 2024-01-16 PROCEDURE — 3078F DIAST BP <80 MM HG: CPT | Performed by: INTERNAL MEDICINE

## 2024-01-16 RX ORDER — LISINOPRIL 10 MG/1
10 TABLET ORAL DAILY
Qty: 90 TABLET | Refills: 1 | Status: SHIPPED | OUTPATIENT
Start: 2024-01-16

## 2024-01-16 RX ORDER — ATORVASTATIN CALCIUM 80 MG/1
80 TABLET, FILM COATED ORAL DAILY
Qty: 90 TABLET | Refills: 1 | Status: SHIPPED | OUTPATIENT
Start: 2024-01-16

## 2024-01-16 ASSESSMENT — ENCOUNTER SYMPTOMS
FEVER: 0
LOSS OF SENSATION IN FEET: 0
BRUISES/BLEEDS EASILY: 0
ARTHRALGIAS: 0
BLOOD IN STOOL: 0
DIFFICULTY URINATING: 0
ABDOMINAL PAIN: 0
DIARRHEA: 0
SINUS PAIN: 0
HYPERTENSION: 1
UNEXPECTED WEIGHT CHANGE: 0
FATIGUE: 0
SORE THROAT: 0
COUGH: 0
DEPRESSION: 0
DIZZINESS: 0
HEADACHES: 0
OCCASIONAL FEELINGS OF UNSTEADINESS: 0
PALPITATIONS: 0
WHEEZING: 0

## 2024-01-16 ASSESSMENT — PATIENT HEALTH QUESTIONNAIRE - PHQ9
2. FEELING DOWN, DEPRESSED OR HOPELESS: NOT AT ALL
SUM OF ALL RESPONSES TO PHQ9 QUESTIONS 1 AND 2: 0
1. LITTLE INTEREST OR PLEASURE IN DOING THINGS: NOT AT ALL

## 2024-01-16 ASSESSMENT — ACTIVITIES OF DAILY LIVING (ADL)
TAKING_MEDICATION: INDEPENDENT
BATHING: INDEPENDENT
GROCERY_SHOPPING: INDEPENDENT
DOING_HOUSEWORK: INDEPENDENT
MANAGING_FINANCES: INDEPENDENT
DRESSING: INDEPENDENT

## 2024-01-16 NOTE — PROGRESS NOTES
"Subjective   Reason for Visit: Ozzy Hernández is an 68 y.o. male here for a Medicare Wellness visit.     Past Medical, Surgical, and Family History reviewed and updated in chart.    Reviewed all medications by prescribing practitioner or clinical pharmacist (such as prescriptions, OTCs, herbal therapies and supplements) and documented in the medical record.    -   Annual preventive visit  - Vaccinations reviewed and up-to-date  - Nicotine dependency  \"I spent more 3 minutes counseling patient about need for smoking/tobacco cessation and how I can support efforts when patient is ready to quit.  Discussed nicotine replacement therapy, Varenicline, Bupropion, hypnosis, support groups, and accupunture as potential options.  Patient currently has no signs or symptoms of tobacco related disease.\".  -Positive Cologuard patient cannot find anyone to stay with him after colonoscopy or transportation patient will be referred to screening colonoscopy in [place] and he need to be admitted postoperatively due to his lung condition and living alone no transportation  -Recent blood work reviewed and up-to-date  - Needs screening for hepatitis C  - Needs abdominal ultrasound screening for abdominal aneurysm  -    Follow-up  -Abnormal lab results  - High liver enzymes patient counseled about low-fat diet avoid any alcohol low-fat diet repeat lipid profile hepatitis C before next appointment and liver profile  - Lung fibrosis under care of pulmonary service  - Patient had lung biopsy awaiting follow-up repeat CT follow-up lung consult for further recommendation  - COPD continue with current inhaler  -Obstructive sleep apnea counseled on sleep hygiene counseled about CPAP  -Hypercholesterolemia on Lipitor 80 mg continue monitoring follow-up lipid profile  -Hypertension improving continue his lisinopril 10 mg, compensated  Needs to complete blood work Medicare physical next appointment  Follow-up 3m months " "      Hypertension  Pertinent negatives include no chest pain, headaches or palpitations.   Hyperlipidemia  Pertinent negatives include no chest pain.   Med Refill  Pertinent negatives include no abdominal pain, arthralgias, chest pain, congestion, coughing, fatigue, fever, headaches, rash or sore throat.       Patient Care Team:  Mariajose Rebolledo MD as PCP - General  Mariajose Rebolledo MD as PCP - [employer] PCP     Review of Systems   Constitutional:  Negative for fatigue, fever and unexpected weight change.   HENT:  Negative for congestion, ear discharge, ear pain, mouth sores, sinus pain and sore throat.    Eyes:  Negative for visual disturbance.   Respiratory:  Negative for cough and wheezing.    Cardiovascular:  Negative for chest pain, palpitations and leg swelling.   Gastrointestinal:  Negative for abdominal pain, blood in stool and diarrhea.   Genitourinary:  Negative for difficulty urinating.   Musculoskeletal:  Negative for arthralgias.   Skin:  Negative for rash.   Neurological:  Negative for dizziness and headaches.   Hematological:  Does not bruise/bleed easily.   Psychiatric/Behavioral:  Negative for behavioral problems.    All other systems reviewed and are negative.      Objective   Vitals:  /74   Pulse 66   Temp 36.2 °C (97.2 °F)   Ht 1.651 m (5' 5\")   Wt 81.1 kg (178 lb 12.8 oz)   SpO2 96%   BMI 29.75 kg/m²       Physical Exam  Vitals and nursing note reviewed.   Constitutional:       Appearance: Normal appearance.   HENT:      Head: Normocephalic.      Nose: Nose normal.   Eyes:      Conjunctiva/sclera: Conjunctivae normal.      Pupils: Pupils are equal, round, and reactive to light.   Cardiovascular:      Rate and Rhythm: Regular rhythm.   Pulmonary:      Effort: Pulmonary effort is normal.      Breath sounds: Normal breath sounds.   Abdominal:      General: Abdomen is flat.      Palpations: Abdomen is soft.   Musculoskeletal:      Cervical back: Neck supple.   Skin:   " "  General: Skin is warm.   Neurological:      General: No focal deficit present.      Mental Status: He is oriented to person, place, and time.   Psychiatric:         Mood and Affect: Mood normal.         Assessment/Plan   Problem List Items Addressed This Visit       COPD with exacerbation (CMS/HCC)    Hypercholesteremia - Primary    Relevant Medications    atorvastatin (Lipitor) 80 mg tablet    Hypertension    Relevant Medications    lisinopril 10 mg tablet    Pulmonary fibrosis (CMS/HCC)    Atherosclerosis of aorta (CMS/HCC)    Chronic respiratory failure with hypoxia (CMS/HCC)    Nail abnormalities    Relevant Orders    Referral to Podiatry    Positive colorectal cancer screening using Cologuard test    Relevant Orders    Colonoscopy Screening; Average Risk Patient    Screening for AAA (abdominal aortic aneurysm)    Relevant Orders    Vascular US abdominal aorta anuerysm AAA screening    Need for hepatitis C screening test    Relevant Orders    Hepatitis C antibody    Abnormal liver enzymes    Relevant Orders    Hepatic function panel     Other Visit Diagnoses       Screening for cardiovascular condition        Relevant Orders    Lipid panel    Routine general medical examination at health care facility              -   Annual preventive visit  - Vaccinations reviewed and up-to-date  - Nicotine dependency  \"I spent more 3 minutes counseling patient about need for smoking/tobacco cessation and how I can support efforts when patient is ready to quit.  Discussed nicotine replacement therapy, Varenicline, Bupropion, hypnosis, support groups, and accupunture as potential options.  Patient currently has no signs or symptoms of tobacco related disease.\".  -Positive Cologuard patient cannot find anyone to stay with him after colonoscopy or transportation patient will be referred to screening colonoscopy in [place] and he need to be admitted postoperatively due to his lung condition and living alone no " transportation  -Recent blood work reviewed and up-to-date  - Needs screening for hepatitis C  - Needs abdominal ultrasound screening for abdominal aneurysm  -    Follow-up  -Abnormal lab results  - High liver enzymes patient counseled about low-fat diet avoid any alcohol low-fat diet repeat lipid profile hepatitis C before next appointment and liver profile  - Lung fibrosis under care of pulmonary service  - Patient had lung biopsy awaiting follow-up repeat CT follow-up lung consult for further recommendation  - COPD continue with current inhaler  -Obstructive sleep apnea counseled on sleep hygiene counseled about CPAP  -Hypercholesterolemia on Lipitor 80 mg continue monitoring follow-up lipid profile  -Hypertension improving continue his lisinopril 10 mg, compensated  Needs to complete blood work Medicare physical next appointment  Follow-up 3m months

## 2024-01-16 NOTE — PROGRESS NOTES
"Subjective   Patient ID: Ozzy Hernández is a 68 y.o. male who presents for Medicare Annual Wellness Visit Subsequent, Hypertension, Hyperlipidemia, Med Refill, and Results.    Abnormal CT scan followed up by pulmonary consult recommendation for PET scan results showed abnormality highly suspicious for malignancy patient seen by cardiothoracic surgery underwent lung biopsy infectious process started on prednisone  Patient need to start on doxycycline twice a day for 14 days  Follow-up pulmonary Dr. Hussein in 2 weeks as recommended  - Nicotine dependency  \"I spent more3  minutes counseling patient about need for smoking/tobacco cessation and how I can support efforts when patient is ready to quit.  Discussed nicotine replacement therapy, Varenicline, Bupropion, hypnosis, support groups, and accupunture as potential options.  Patient currently has no signs or symptoms of tobacco related disease.\".  - COPD exacerbation-  Continue with doxycycline as recommended, underlying lung fibrosis continue with current inhaler  -Obstructive sleep apnea counseled on sleep hygiene counseled about CPAP  -Hypercholesterolemia on Lipitor 80 mg continue monitoring follow-up lipid profile  -Hypertension improving continue his lisinopril 10 mg, compensated  Needs to complete blood work Medicare physical next appointment  Follow-up 3m months          Hypertension    Hyperlipidemia    Med Refill           Review of Systems    Objective   No results found for: \"HGBA1C\"   /74   Pulse 66   Temp 36.2 °C (97.2 °F)   Ht 1.651 m (5' 5\")   Wt 81.1 kg (178 lb 12.8 oz)   SpO2 96%   BMI 29.75 kg/m²   Lab Results   Component Value Date    WBC 11.3 12/29/2023    HGB 16.2 12/29/2023    HCT 48.3 12/29/2023     12/29/2023    CHOL 115 12/29/2023    TRIG 116 12/29/2023    HDL 38.8 12/29/2023    ALT 57 (H) 12/29/2023    AST 41 (H) 12/29/2023     12/29/2023    K 4.3 12/29/2023     12/29/2023    CREATININE 0.90 12/29/2023 "    BUN 8 12/29/2023    CO2 27 12/29/2023    TSH 0.87 12/29/2023    PSA 0.40 12/29/2023    INR 1.1 09/21/2023     par   Physical Exam    Assessment/Plan   Ozzy was seen today for medicare annual wellness visit subsequent, hypertension, hyperlipidemia, med refill and results.  Diagnoses and all orders for this visit:  Hypercholesteremia  Hypertension, unspecified type  Other orders  -     Follow Up In Primary Care - Medicare Annual

## 2024-01-30 ENCOUNTER — HOSPITAL ENCOUNTER (OUTPATIENT)
Dept: RADIOLOGY | Facility: HOSPITAL | Age: 69
Discharge: HOME | End: 2024-01-30
Payer: MEDICARE

## 2024-01-30 DIAGNOSIS — R91.8 LUNG MASS: ICD-10-CM

## 2024-01-30 PROCEDURE — 71250 CT THORAX DX C-: CPT

## 2024-01-30 PROCEDURE — 71250 CT THORAX DX C-: CPT | Performed by: RADIOLOGY

## 2024-02-05 ENCOUNTER — OFFICE VISIT (OUTPATIENT)
Dept: PULMONOLOGY | Facility: CLINIC | Age: 69
End: 2024-02-05
Payer: MEDICARE

## 2024-02-05 VITALS
WEIGHT: 176 LBS | HEART RATE: 63 BPM | BODY MASS INDEX: 29.29 KG/M2 | SYSTOLIC BLOOD PRESSURE: 119 MMHG | OXYGEN SATURATION: 93 % | DIASTOLIC BLOOD PRESSURE: 78 MMHG

## 2024-02-05 DIAGNOSIS — J44.9 CHRONIC OBSTRUCTIVE PULMONARY DISEASE, UNSPECIFIED COPD TYPE (MULTI): ICD-10-CM

## 2024-02-05 DIAGNOSIS — R91.8 LUNG MASS: Primary | ICD-10-CM

## 2024-02-05 DIAGNOSIS — J84.10 PULMONARY FIBROSIS (MULTI): ICD-10-CM

## 2024-02-05 DIAGNOSIS — R09.02 HYPOXIA: ICD-10-CM

## 2024-02-05 PROCEDURE — 3074F SYST BP LT 130 MM HG: CPT | Performed by: PEDIATRICS

## 2024-02-05 PROCEDURE — 99215 OFFICE O/P EST HI 40 MIN: CPT | Performed by: PEDIATRICS

## 2024-02-05 PROCEDURE — 1160F RVW MEDS BY RX/DR IN RCRD: CPT | Performed by: PEDIATRICS

## 2024-02-05 PROCEDURE — 3078F DIAST BP <80 MM HG: CPT | Performed by: PEDIATRICS

## 2024-02-05 PROCEDURE — 1159F MED LIST DOCD IN RCRD: CPT | Performed by: PEDIATRICS

## 2024-02-05 PROCEDURE — 1126F AMNT PAIN NOTED NONE PRSNT: CPT | Performed by: PEDIATRICS

## 2024-02-05 NOTE — PROGRESS NOTES
"Subjective   Patient ID: Ozzy Hernández is a 68 y.o. male who presents for  pulmonary fivbrosis, lung nodules, hypoxia    HPI    Mr. Hernández is a 68 y.o man, smoker (1 PPD since age 16), being evaluated for ILD.      PCP: Dr. Mariajose Parish     HPI:  10/01/2018: At baseline, he denies any dyspnea on exertion or at rest. He is active in her everyday life and carries loads and does strenuous exercise. He is only troubled by breathlessness except on strenuous exercise (mMRC 0) His CAT score is <10. He also denies orthopnea, pnd, or jose roberto. His weight has been mostly stable. He also relates chronic cough, mostly dry but occasionally with clear or green sputum. No hemoptysis. No fever or shivering chills. He has no runny nose, or a tingling sensation in the back of his throat. He denies chest pain or heartburn. Elwell score (ESS) is 2.     10/17/2018: Since the last visit, patient's breathing is mostly unchanged. No new cough, sputum, f/c/ns. CAT 10. Had 6MWT and labs done (results below).      04/15/2019: Since the last visit, patient's breathing is mostly unchanged. He saw Dr. Casper for possible VATS but decline biopsy. He continues to be short of breath on exertion mMRC 2. stable cough with minimal sputum. Had a repeat CT scan done (results below).      06/03/2019: CAT 7. Since the last visit, patient's breathing is mostly unchanged. Thinks his cough is worse mostly at night. Continues to smoke. Not using any inhalers because he can't afford them. Had repeat PFTs, 6MWT, repeat CT and echo done (results below).         7/22/2021: Mr Hernández is here for follow up. He has not been seen since 2019. He has no inhalers and only wears oxygen \"when I need it\". He feels about at baseline. He is still not interested in getting a lung biopsy. He does want an inogen POC.      3/2/2022: Mr Hernández is doing ok. He is using breo daily. He had a PFT which shows mild obstruction, mild restriction and severe reduction in " diffusing capacity. He had a 6mwt test with oxygen titration and requires 1-2 lpm to maintain adequate oxygen saturation.     6/15/2022: Mr Hernández is doing ok, he is using breo daily and rarely needs albuterol. He is wearing oxygen continuously and would like a POC, [facility] has only supplied small tanks currently.     7/27/2023: Mr Hernández feels about the same (at baseline), he is using breo and duoneb or albuterol as needed. He had a repeat CT which shows a 3.3 x 2.6cm LLL lung mass. He then had a PET scan and it shows an SUV of 4.8.     10/23/2023:  Mr Hernández wsas evaluated by CT surgery and found to not be a candidate for surgical resection of the LLL  lung mass.  He did get a CT guided biopsy and that showed inflammatory cells.  HE also had a PFT done which shows mild/moderate obstruction and moderate restriction with significantly low DLCO.  He tried to get a POC (order written last visit) but he has to use tanks consistently for 3 months before insurance will allow him to get a POC.  He is not interested in doing this.    2/5/2023:  Mr Hernández is about the same.  He is no longer using breo due to cost.  He uses duoneb in the morning and albuterol as needed (usually not every day).  He ahd a repeat CT that shows resolution of the LLL nodule but a new RUL nodule is present.            Previous pulmonary history:   He has of ? Asthma and recurrent bronchitis as a child. He was previously told he has COPD but did not have PFTs prior to today. He currently is on no supplemental oxygen. He has never been to pulmonary rehab. Does not recall having AECOPD requiring antibiotics or prednisone.     Inhalers/nebulized medications: only prn pro-air      Sleep history:  Complains of snoring, Fiance witnesses him having apneas frequently at night. Does not feel tired during the day or take frequent naps.  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 No known exposure to asbestos, silica or beryllium. 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      Family History:  No family history of lung diseases or cancer     Imaging history: (I have personally reviewed the imaging below)  04/03/2019 CT with decrease in the size of left basilar pulm nodule now measure 1.4cm. Stable fibrosis with honeycombing.   05/08/2018: CT chest (compared to 03/2018) subpleural reticulation with GGO opacification without honeycombing or traction bronchiolectasis consistent with indeterminate pattern. Mild emphysema. Stable nodules largest 5m and 4.2 cm opacity at the left lower lobe  PET in 03/2018 -> negative      PFTs:   05/08/2019 -> Ratio of 0.66/FEV1 2.27L (77%) (no BD response)/FVC 3.43L (87%)/TLC 76%/RVtoTLC ratio 0.28/DLCO 38->68%  09/28/2018 -> FEV1/FVC ratio 0.72/FEV1 2.5L (84%)(no BD response)/FVC 3.47L (88.4%)/DLCO 41->58%/TLC 80%/RV to TLC ratio 0.31  8/31/2023: mild/moderate obstruction (FEV1 70%) moderate restriction (TLC 66%). Low DLCO (30%)      6 MWTs:   05/08/2019 -> on RA, 245m desaturation from 98 -> 93%  10/17/2018 -> On RA, 359m with desaturation from 98 -> 93%     Lung biopsy: None on record     Echo:  05/08/2019-> Normal EF, diastolic dysfunction, with normal LA, RV size and function      Labs:  08/2018 Normal Hb, no eosinophilia, normal kidney function test, mild transaminitis    Review of Systems    See scanned documents attached to this note for review of systems, and appropriate scales/scores for this visit.      Objective   Physical Exam  Constitutional:       Appearance: Normal appearance.   HENT:      Head: Normocephalic and atraumatic.      Mouth/Throat:      Pharynx: Oropharynx is clear.   Cardiovascular:      Rate and Rhythm: Normal rate and regular rhythm.      Pulses: Normal pulses.       Heart sounds: Normal heart sounds.   Pulmonary:      Effort: Pulmonary effort is normal.      Breath sounds: No wheezing, rhonchi or rales.      Comments: Dry crackles both bases.  Abdominal:      General: Bowel sounds are normal.      Palpations: Abdomen is soft.   Musculoskeletal:         General: Normal range of motion.      Comments: Digital clubbing present   Skin:     General: Skin is warm and dry.   Neurological:      General: No focal deficit present.      Mental Status: He is alert and oriented to person, place, and time.   Psychiatric:         Mood and Affect: Mood normal.             Assessment/Plan     Mr. Hernández is a 67 y.o man, smoker (1 PPD since age 16) with pulmonary fibrosis and COPD with hypoxia and lung mass concerning for malignancy     # Pulmonary fibrosis: not interested in Bx or further work up   - yearly PFT  ([facility] 2024)        # COPD with emphysema:  - continue duoneb and albuterol HFA      # hypoxia: supposed to be on 2lpm continuously.  - encouraged oxygen use  - POC patient states he ahs been using tanks for 8 months.  Order for POC sent to [facility]     # Lung mass:   - biopsy shows inflammation  - repeat CT shows resolution of LLL but new RUL.  -will get repeat CT in 3 months     #Smoking:  - encouraged cessation     f/u after CT       Clem Hussein MD 02/05/24 8:55 AM

## 2024-03-19 ENCOUNTER — LAB (OUTPATIENT)
Dept: LAB | Facility: LAB | Age: 69
End: 2024-03-19
Payer: MEDICARE

## 2024-03-19 DIAGNOSIS — Z11.59 NEED FOR HEPATITIS C SCREENING TEST: ICD-10-CM

## 2024-03-19 DIAGNOSIS — Z13.6 SCREENING FOR CARDIOVASCULAR CONDITION: ICD-10-CM

## 2024-03-19 DIAGNOSIS — R74.8 ABNORMAL LIVER ENZYMES: ICD-10-CM

## 2024-03-19 LAB
ALBUMIN SERPL BCP-MCNC: 4.1 G/DL (ref 3.4–5)
ALP SERPL-CCNC: 101 U/L (ref 33–136)
ALT SERPL W P-5'-P-CCNC: 61 U/L (ref 10–52)
AST SERPL W P-5'-P-CCNC: 37 U/L (ref 9–39)
BILIRUB DIRECT SERPL-MCNC: 0.1 MG/DL (ref 0–0.3)
BILIRUB SERPL-MCNC: 0.6 MG/DL (ref 0–1.2)
CHOLEST SERPL-MCNC: 112 MG/DL (ref 0–199)
CHOLESTEROL/HDL RATIO: 3
HDLC SERPL-MCNC: 37.4 MG/DL
LDLC SERPL CALC-MCNC: 48 MG/DL
NON HDL CHOLESTEROL: 75 MG/DL (ref 0–149)
PROT SERPL-MCNC: 7.5 G/DL (ref 6.4–8.2)
TRIGL SERPL-MCNC: 131 MG/DL (ref 0–149)
VLDL: 26 MG/DL (ref 0–40)

## 2024-03-19 PROCEDURE — 36415 COLL VENOUS BLD VENIPUNCTURE: CPT

## 2024-03-19 PROCEDURE — 86803 HEPATITIS C AB TEST: CPT

## 2024-03-19 PROCEDURE — 80061 LIPID PANEL: CPT

## 2024-03-19 PROCEDURE — 80076 HEPATIC FUNCTION PANEL: CPT

## 2024-03-20 LAB — HCV AB SER QL: NONREACTIVE

## 2024-04-02 ENCOUNTER — HOSPITAL ENCOUNTER (OUTPATIENT)
Dept: RADIOLOGY | Facility: HOSPITAL | Age: 69
Discharge: HOME | End: 2024-04-02
Payer: MEDICARE

## 2024-04-02 DIAGNOSIS — R91.8 LUNG MASS: ICD-10-CM

## 2024-04-02 PROCEDURE — 71250 CT THORAX DX C-: CPT

## 2024-04-02 PROCEDURE — 71250 CT THORAX DX C-: CPT | Performed by: RADIOLOGY

## 2024-04-16 ENCOUNTER — OFFICE VISIT (OUTPATIENT)
Dept: PRIMARY CARE | Facility: CLINIC | Age: 69
End: 2024-04-16
Payer: MEDICARE

## 2024-04-16 VITALS
WEIGHT: 176.4 LBS | SYSTOLIC BLOOD PRESSURE: 108 MMHG | BODY MASS INDEX: 29.35 KG/M2 | HEART RATE: 79 BPM | OXYGEN SATURATION: 94 % | DIASTOLIC BLOOD PRESSURE: 70 MMHG

## 2024-04-16 DIAGNOSIS — J96.11 CHRONIC RESPIRATORY FAILURE WITH HYPOXIA (MULTI): ICD-10-CM

## 2024-04-16 DIAGNOSIS — R91.8 LUNG MASS: ICD-10-CM

## 2024-04-16 DIAGNOSIS — E78.00 HYPERCHOLESTEREMIA: ICD-10-CM

## 2024-04-16 DIAGNOSIS — K70.9 ALCOHOLIC LIVER DISEASE (MULTI): Primary | ICD-10-CM

## 2024-04-16 DIAGNOSIS — Z53.20 COLON CANCER SCREENING DECLINED: ICD-10-CM

## 2024-04-16 DIAGNOSIS — R74.8 ABNORMAL LIVER ENZYMES: ICD-10-CM

## 2024-04-16 DIAGNOSIS — R73.09 ABNORMAL BLOOD SUGAR: ICD-10-CM

## 2024-04-16 DIAGNOSIS — R19.5 POSITIVE COLORECTAL CANCER SCREENING USING COLOGUARD TEST: ICD-10-CM

## 2024-04-16 PROCEDURE — 1159F MED LIST DOCD IN RCRD: CPT | Performed by: INTERNAL MEDICINE

## 2024-04-16 PROCEDURE — 4004F PT TOBACCO SCREEN RCVD TLK: CPT | Performed by: INTERNAL MEDICINE

## 2024-04-16 PROCEDURE — 99214 OFFICE O/P EST MOD 30 MIN: CPT | Performed by: INTERNAL MEDICINE

## 2024-04-16 PROCEDURE — 3074F SYST BP LT 130 MM HG: CPT | Performed by: INTERNAL MEDICINE

## 2024-04-16 PROCEDURE — 3078F DIAST BP <80 MM HG: CPT | Performed by: INTERNAL MEDICINE

## 2024-04-16 PROCEDURE — 1160F RVW MEDS BY RX/DR IN RCRD: CPT | Performed by: INTERNAL MEDICINE

## 2024-04-16 ASSESSMENT — ENCOUNTER SYMPTOMS
SINUS PAIN: 0
DIZZINESS: 0
DIFFICULTY URINATING: 0
UNEXPECTED WEIGHT CHANGE: 0
PALPITATIONS: 0
SORE THROAT: 0
COUGH: 0
WHEEZING: 0
BRUISES/BLEEDS EASILY: 0
ABDOMINAL PAIN: 0
HEADACHES: 0
HYPERTENSION: 1
DIARRHEA: 0
FATIGUE: 0
BLOOD IN STOOL: 0
FEVER: 0
ARTHRALGIAS: 0

## 2024-04-16 NOTE — PROGRESS NOTES
"Subjective   Patient ID: Ozzy Hernández is a 68 y.o. male who presents for Hyperlipidemia, Hypertension, Results (BW), and Colonoscopy (Patient does not want colonoscopy).    - Abnormal liver enzymes ALT 61 patient counseled about alcohol moderation because about alcohol liver disease  Need to proceed with liver ultrasound hemoglobin A1c  Hepatitis C screening negative  - Patient Cologuard positive again after multiple discussions different office visits regarding the need for colonoscopy patient aware of risk and benefit and declined the procedure aware of having cancer he is not going to do anything about it cannot afford it  Patient signed AGAINST MEDICAL ADVICE for procedure In the chart today and awakeness my medical assistant Shayy Rico  -Abnormal CT seen by pulmonary possible infectious process awaiting repeating CT follow-up Dr. Hussein  -Chronic hypoxemia using O2 as needed per pulse ox  Nicotine dependency  \"I spent more 3 minutes counseling patient about need for smoking/tobacco cessation and how I can support efforts when patient is ready to quit.  Discussed nicotine replacement therapy, Varenicline, Bupropion, hypnosis, support groups, and accupunture as potential options.  Patient currently has no signs or symptoms of tobacco related disease.\".-   - Lung fibrosis under care of pulmonary service  - Patient had lung biopsy awaiting follow-up repeat CT follow-up lung consult for further recommendation  - COPD continue with current inhaler  -Obstructive sleep apnea counseled on sleep hygiene counseled about CPAP  -Hypercholesterolemia on Lipitor 80 mg continue monitoring follow-up lipid profile  -Hypertension improving continue his lisinopril 10 mg, compensated  Follow-up 3m months       Hyperlipidemia  Pertinent negatives include no chest pain.   Hypertension  Pertinent negatives include no chest pain, headaches or palpitations.          Review of Systems   Constitutional:  Negative for fatigue, " "fever and unexpected weight change.   HENT:  Negative for congestion, ear discharge, ear pain, mouth sores, sinus pain and sore throat.    Eyes:  Negative for visual disturbance.   Respiratory:  Negative for cough and wheezing.    Cardiovascular:  Negative for chest pain, palpitations and leg swelling.   Gastrointestinal:  Negative for abdominal pain, blood in stool and diarrhea.   Genitourinary:  Negative for difficulty urinating.   Musculoskeletal:  Negative for arthralgias.   Skin:  Negative for rash.   Neurological:  Negative for dizziness and headaches.   Hematological:  Does not bruise/bleed easily.   Psychiatric/Behavioral:  Negative for behavioral problems.    All other systems reviewed and are negative.      Objective   No results found for: \"HGBA1C\"   /70   Pulse 79   Wt 80 kg (176 lb 6.4 oz)   SpO2 94%   BMI 29.35 kg/m²   Lab Results   Component Value Date    WBC 11.3 12/29/2023    HGB 16.2 12/29/2023    HCT 48.3 12/29/2023     12/29/2023    CHOL 112 03/19/2024    TRIG 131 03/19/2024    HDL 37.4 03/19/2024    ALT 61 (H) 03/19/2024    AST 37 03/19/2024     12/29/2023    K 4.3 12/29/2023     12/29/2023    CREATININE 0.90 12/29/2023    BUN 8 12/29/2023    CO2 27 12/29/2023    TSH 0.87 12/29/2023    PSA 0.40 12/29/2023    INR 1.1 09/21/2023     par   Physical Exam  Vitals and nursing note reviewed.   Constitutional:       Appearance: Normal appearance.   HENT:      Head: Normocephalic.      Nose: Nose normal.   Eyes:      Conjunctiva/sclera: Conjunctivae normal.      Pupils: Pupils are equal, round, and reactive to light.   Cardiovascular:      Rate and Rhythm: Regular rhythm.   Pulmonary:      Effort: Pulmonary effort is normal.      Breath sounds: Normal breath sounds.   Abdominal:      General: Abdomen is flat.      Palpations: Abdomen is soft.   Musculoskeletal:      Cervical back: Neck supple.   Skin:     General: Skin is warm.   Neurological:      General: No focal deficit " "present.      Mental Status: He is oriented to person, place, and time.   Psychiatric:         Mood and Affect: Mood normal.         Assessment/Plan   Ozzy was seen today for hyperlipidemia, hypertension, results and colonoscopy.  Diagnoses and all orders for this visit:  Alcoholic liver disease (Multi) (Primary)  -     US abdomen limited liver; Future  -     Hemoglobin A1C; Future  Abnormal blood sugar  -     Hemoglobin A1C; Future  Colon cancer screening declined  Lung mass  Hypercholesteremia  Chronic respiratory failure with hypoxia (Multi)  Positive colorectal cancer screening using Cologuard test  Abnormal liver enzymes  Other orders  -     Follow Up In Primary Care - Established  -     Follow Up In Primary Care - Established; Future   - Abnormal liver enzymes ALT 61 patient counseled about alcohol moderation because about alcohol liver disease  Need to proceed with liver ultrasound hemoglobin A1c  Hepatitis C screening negative  - Patient Cologuard positive again after multiple discussions different office visits regarding the need for colonoscopy patient aware of risk and benefit and declined the procedure aware of having cancer he is not going to do anything about it cannot afford it  Patient signed AGAINST MEDICAL ADVICE for procedure In the chart today and awakeness my medical assistant Shayy Rico  -Abnormal CT seen by pulmonary possible infectious process awaiting repeating CT follow-up Dr. Hussein  -Chronic hypoxemia using O2 as needed per pulse ox  Nicotine dependency  \"I spent more 3 minutes counseling patient about need for smoking/tobacco cessation and how I can support efforts when patient is ready to quit.  Discussed nicotine replacement therapy, Varenicline, Bupropion, hypnosis, support groups, and accupunture as potential options.  Patient currently has no signs or symptoms of tobacco related disease.\".-   - Lung fibrosis under care of pulmonary service  - Patient had lung biopsy " awaiting follow-up repeat CT follow-up lung consult for further recommendation  - COPD continue with current inhaler  -Obstructive sleep apnea counseled on sleep hygiene counseled about CPAP  -Hypercholesterolemia on Lipitor 80 mg continue monitoring follow-up lipid profile  -Hypertension improving continue his lisinopril 10 mg, compensated  Follow-up 3m months

## 2024-04-22 ENCOUNTER — OFFICE VISIT (OUTPATIENT)
Dept: PULMONOLOGY | Facility: CLINIC | Age: 69
End: 2024-04-22
Payer: MEDICARE

## 2024-04-22 VITALS
WEIGHT: 176.6 LBS | HEART RATE: 62 BPM | OXYGEN SATURATION: 92 % | BODY MASS INDEX: 29.39 KG/M2 | DIASTOLIC BLOOD PRESSURE: 78 MMHG | SYSTOLIC BLOOD PRESSURE: 116 MMHG

## 2024-04-22 DIAGNOSIS — J84.9 ILD (INTERSTITIAL LUNG DISEASE) (MULTI): ICD-10-CM

## 2024-04-22 DIAGNOSIS — J44.1 COPD WITH EXACERBATION (MULTI): ICD-10-CM

## 2024-04-22 DIAGNOSIS — J96.11 CHRONIC RESPIRATORY FAILURE WITH HYPOXIA (MULTI): Primary | ICD-10-CM

## 2024-04-22 DIAGNOSIS — R91.1 LUNG NODULE: ICD-10-CM

## 2024-04-22 DIAGNOSIS — C34.11 MALIGNANT NEOPLASM OF UPPER LOBE, RIGHT BRONCHUS OR LUNG (MULTI): ICD-10-CM

## 2024-04-22 DIAGNOSIS — F17.218 CIGARETTE NICOTINE DEPENDENCE WITH OTHER NICOTINE-INDUCED DISORDER: ICD-10-CM

## 2024-04-22 PROCEDURE — 3074F SYST BP LT 130 MM HG: CPT | Performed by: PEDIATRICS

## 2024-04-22 PROCEDURE — 1160F RVW MEDS BY RX/DR IN RCRD: CPT | Performed by: PEDIATRICS

## 2024-04-22 PROCEDURE — 3078F DIAST BP <80 MM HG: CPT | Performed by: PEDIATRICS

## 2024-04-22 PROCEDURE — 99215 OFFICE O/P EST HI 40 MIN: CPT | Performed by: PEDIATRICS

## 2024-04-22 PROCEDURE — 4004F PT TOBACCO SCREEN RCVD TLK: CPT | Performed by: PEDIATRICS

## 2024-04-22 PROCEDURE — 1159F MED LIST DOCD IN RCRD: CPT | Performed by: PEDIATRICS

## 2024-04-22 RX ORDER — AZITHROMYCIN 250 MG/1
TABLET, FILM COATED ORAL
Qty: 6 TABLET | Refills: 0 | Status: SHIPPED | OUTPATIENT
Start: 2024-04-22 | End: 2024-04-27

## 2024-04-22 RX ORDER — PREDNISONE 20 MG/1
40 TABLET ORAL DAILY
Qty: 10 TABLET | Refills: 0 | Status: SHIPPED | OUTPATIENT
Start: 2024-04-22 | End: 2024-04-27

## 2024-04-22 NOTE — PROGRESS NOTES
"Subjective   Patient ID: Ozzy Hernández is a 68 y.o. male who presents for pulmonary fibrosis, emphysema, lung nodules    HPI    Mr. Hernández is a 68 y.o man, smoker (1 PPD since age 16), being evaluated for ILD.      PCP: Dr. Mariajose Parish     HPI:  10/01/2018: At baseline, he denies any dyspnea on exertion or at rest. He is active in her everyday life and carries loads and does strenuous exercise. He is only troubled by breathlessness except on strenuous exercise (mMRC 0) His CAT score is <10. He also denies orthopnea, pnd, or jose roberto. His weight has been mostly stable. He also relates chronic cough, mostly dry but occasionally with clear or green sputum. No hemoptysis. No fever or shivering chills. He has no runny nose, or a tingling sensation in the back of his throat. He denies chest pain or heartburn. Gile score (ESS) is 2.     10/17/2018: Since the last visit, patient's breathing is mostly unchanged. No new cough, sputum, f/c/ns. CAT 10. Had 6MWT and labs done (results below).      04/15/2019: Since the last visit, patient's breathing is mostly unchanged. He saw Dr. Casper for possible VATS but decline biopsy. He continues to be short of breath on exertion mMRC 2. stable cough with minimal sputum. Had a repeat CT scan done (results below).      06/03/2019: CAT 7. Since the last visit, patient's breathing is mostly unchanged. Thinks his cough is worse mostly at night. Continues to smoke. Not using any inhalers because he can't afford them. Had repeat PFTs, 6MWT, repeat CT and echo done (results below).         7/22/2021: Mr Hernández is here for follow up. He has not been seen since 2019. He has no inhalers and only wears oxygen \"when I need it\". He feels about at baseline. He is still not interested in getting a lung biopsy. He does want an inogen POC.      3/2/2022: Mr Hernández is doing ok. He is using breo daily. He had a PFT which shows mild obstruction, mild restriction and severe reduction in " diffusing capacity. He had a 6mwt test with oxygen titration and requires 1-2 lpm to maintain adequate oxygen saturation.     6/15/2022: Mr Hernández is doing ok, he is using breo daily and rarely needs albuterol. He is wearing oxygen continuously and would like a POC, [facility] has only supplied small tanks currently.     7/27/2023: Mr Hernández feels about the same (at baseline), he is using breo and duoneb or albuterol as needed. He had a repeat CT which shows a 3.3 x 2.6cm LLL lung mass. He then had a PET scan and it shows an SUV of 4.8.     10/23/2023:  Mr Hernández wsas evaluated by CT surgery and found to not be a candidate for surgical resection of the LLL  lung mass.  He did get a CT guided biopsy and that showed inflammatory cells.  HE also had a PFT done which shows mild/moderate obstruction and moderate restriction with significantly low DLCO.  He tried to get a POC (order written last visit) but he has to use tanks consistently for 3 months before insurance will allow him to get a POC.  He is not interested in doing this.     2/5/2023:  Mr Hernández is about the same.  He is no longer using breo due to cost.  He uses duoneb in the morning and albuterol as needed (usually not every day).  He ahd a repeat CT that shows resolution of the LLL nodule but a new RUL nodule is present.         4/22/2024:  Mr Hernández is about the same.  He is using duoneb which works well for him.  He had a repeat CT that shows the RUL nodule has increased in size,  he has noticed he is coughing a bit more than usual.       Previous pulmonary history:   He has of ? Asthma and recurrent bronchitis as a child. He was previously told he has COPD but did not have PFTs prior to today. He currently is on no supplemental oxygen. He has never been to pulmonary rehab. Does not recall having AECOPD requiring antibiotics or prednisone.     Inhalers/nebulized medications: only prn pro-air      Sleep history:  Complains of snoring, Fiance witnesses  him having apneas frequently at night. Does not feel tired during the day or take frequent naps.  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 No known exposure to asbestos, silica or beryllium. 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      Family History:  No family history of lung diseases or cancer        PFTs:   05/08/2019 -> Ratio of 0.66/FEV1 2.27L (77%) (no BD response)/FVC 3.43L (87%)/TLC 76%/RVtoTLC ratio 0.28/DLCO 38->68%  09/28/2018 -> FEV1/FVC ratio 0.72/FEV1 2.5L (84%)(no BD response)/FVC 3.47L (88.4%)/DLCO 41->58%/TLC 80%/RV to TLC ratio 0.31  8/31/2023: mild/moderate obstruction (FEV1 70%) moderate restriction (TLC 66%). Low DLCO (30%)      6 MWTs:   05/08/2019 -> on RA, 245m desaturation from 98 -> 93%  10/17/2018 -> On RA, 359m with desaturation from 98 -> 93%        Review of Systems    See scanned documents attached to this note for review of systems, and appropriate scales/scores for this visit.      Objective   Physical Exam  Constitutional:       Appearance: Normal appearance.   HENT:      Head: Normocephalic and atraumatic.      Mouth/Throat:      Pharynx: Oropharynx is clear.   Cardiovascular:      Rate and Rhythm: Normal rate and regular rhythm.      Pulses: Normal pulses.      Heart sounds: Normal heart sounds.   Pulmonary:      Effort: Pulmonary effort is normal.      Breath sounds: No wheezing, rhonchi or rales.      Comments: Dry crackles heard both bases  Abdominal:      General: Bowel sounds are normal.      Palpations: Abdomen is soft.   Musculoskeletal:         General: Normal range of motion.   Skin:     General: Skin is warm and dry.   Neurological:      General: No focal deficit present.      Mental Status: He is alert and oriented to person, place, and time.   Psychiatric:         Mood and  Affect: Mood normal.         Assessment/Plan     Mr. Hernández is a 68 y.o man, smoker (1 PPD since age 16) with pulmonary fibrosis and COPD with hypoxia and lung nodule that has increased     # Pulmonary fibrosis: not interested in Bx or further work up   - yearly PFT  ([facility] 2024)        # COPD with emphysema:  - continue duoneb and albuterol HFA      # hypoxia: supposed to be on 2lpm continuously. He is using oxygen more frequently now, he tells me he will qualify for a POC sometime in June.  - encouraged oxygen use  - POC instructed to call the office when he qualifies for POC and we will order for him ([facility])     # Lung mass: LLL resolved, RUL enlarging likely inflammatory but given smoking history he is at high risk  - will get PET but take prednisone and azithromycin prior to PET scan     #Smoking:  - encouraged cessation     Phone follow up for PET results  appointment will be determined based on results.      Clem Hussein MD 04/22/24 9:09 AM

## 2024-05-22 ENCOUNTER — HOSPITAL ENCOUNTER (OUTPATIENT)
Dept: RADIOLOGY | Facility: HOSPITAL | Age: 69
Discharge: HOME | End: 2024-05-22
Payer: MEDICARE

## 2024-05-22 DIAGNOSIS — R91.1 LUNG NODULE: ICD-10-CM

## 2024-05-22 DIAGNOSIS — C34.11 MALIGNANT NEOPLASM OF UPPER LOBE, RIGHT BRONCHUS OR LUNG (MULTI): ICD-10-CM

## 2024-05-22 PROCEDURE — A9552 F18 FDG: HCPCS | Mod: SE | Performed by: PEDIATRICS

## 2024-05-22 PROCEDURE — 78815 PET IMAGE W/CT SKULL-THIGH: CPT | Mod: PI

## 2024-05-22 PROCEDURE — 3430000001 HC RX 343 DIAGNOSTIC RADIOPHARMACEUTICALS: Mod: SE | Performed by: PEDIATRICS

## 2024-05-22 PROCEDURE — 78815 PET IMAGE W/CT SKULL-THIGH: CPT | Mod: PET TUMOR INIT TX STRAT | Performed by: RADIOLOGY

## 2024-05-22 RX ORDER — FLUDEOXYGLUCOSE F 18 200 MCI/ML
10.9 INJECTION, SOLUTION INTRAVENOUS
Status: COMPLETED | OUTPATIENT
Start: 2024-05-22 | End: 2024-05-22

## 2024-05-22 RX ADMIN — FLUDEOXYGLUCOSE F 18 10.9 MILLICURIE: 200 INJECTION, SOLUTION INTRAVENOUS at 10:57

## 2024-05-23 ENCOUNTER — DOCUMENTATION (OUTPATIENT)
Dept: PULMONOLOGY | Facility: HOSPITAL | Age: 69
End: 2024-05-23
Payer: MEDICARE

## 2024-05-23 DIAGNOSIS — R91.1 LUNG NODULE: Primary | ICD-10-CM

## 2024-05-23 DIAGNOSIS — Z01.818 PRE-OP TESTING: ICD-10-CM

## 2024-05-23 NOTE — PROGRESS NOTES
Bronchoscopy Scheduling Request    Pre-bronchoscopy visit: Follow-up visit with Bronchoscopy group provider  Please schedule procedure: Next available    Cytology on-site:  Yes  Location:  [facility]  Performing physician:  Advanced diagnostic bronchoscopist  Referring physician:  Ernesto Hussein MD, Mariajose Rebolledo MD  Indication:  RUL nodule  Sedation / Anesthesia:  GA  Procedure:  Navigational bronchoscopy, Staging EBUS  Time:  Tier 3  Fluorscopy:   Yes  Imaging needed:  CT Roberto - same day as procedure  Labs:  CBC, BMP  Meds:  None  Special Considerations:  None  Reviewed by:  Simeon Carballo MD

## 2024-05-23 NOTE — PROGRESS NOTES
Spoke with patient r.e. PET results.  Concern for RUL lung cancer with deborah involvement.  I told him I will have interventional bronchoscopy team review and call him to set up biopsy.  (Sent e-mail to IP team)

## 2024-07-09 DIAGNOSIS — J44.9 CHRONIC OBSTRUCTIVE PULMONARY DISEASE, UNSPECIFIED COPD TYPE (MULTI): ICD-10-CM

## 2024-07-09 DIAGNOSIS — J84.10 PULMONARY FIBROSIS (MULTI): ICD-10-CM

## 2024-07-09 DIAGNOSIS — R09.02 HYPOXIA: ICD-10-CM

## 2024-07-16 ENCOUNTER — HOSPITAL ENCOUNTER (OUTPATIENT)
Dept: RADIOLOGY | Facility: HOSPITAL | Age: 69
Discharge: HOME | End: 2024-07-16
Payer: MEDICARE

## 2024-07-16 ENCOUNTER — APPOINTMENT (OUTPATIENT)
Dept: PRIMARY CARE | Facility: CLINIC | Age: 69
End: 2024-07-16
Payer: MEDICARE

## 2024-07-16 DIAGNOSIS — K70.9 ALCOHOLIC LIVER DISEASE (MULTI): ICD-10-CM

## 2024-07-16 PROCEDURE — 76705 ECHO EXAM OF ABDOMEN: CPT | Performed by: RADIOLOGY

## 2024-07-16 PROCEDURE — 76705 ECHO EXAM OF ABDOMEN: CPT

## 2024-07-23 ENCOUNTER — LAB (OUTPATIENT)
Dept: LAB | Facility: LAB | Age: 69
End: 2024-07-23
Payer: MEDICARE

## 2024-07-23 ENCOUNTER — APPOINTMENT (OUTPATIENT)
Dept: PRIMARY CARE | Facility: CLINIC | Age: 69
End: 2024-07-23
Payer: MEDICARE

## 2024-07-23 VITALS
DIASTOLIC BLOOD PRESSURE: 60 MMHG | BODY MASS INDEX: 28.89 KG/M2 | WEIGHT: 173.6 LBS | OXYGEN SATURATION: 96 % | SYSTOLIC BLOOD PRESSURE: 100 MMHG | HEART RATE: 60 BPM | TEMPERATURE: 97.3 F

## 2024-07-23 DIAGNOSIS — K70.9 ALCOHOLIC LIVER DISEASE (MULTI): ICD-10-CM

## 2024-07-23 DIAGNOSIS — E78.00 HYPERCHOLESTEREMIA: ICD-10-CM

## 2024-07-23 DIAGNOSIS — R79.9 ABNORMAL FINDING OF BLOOD CHEMISTRY, UNSPECIFIED: ICD-10-CM

## 2024-07-23 DIAGNOSIS — R42 DIZZINESS: ICD-10-CM

## 2024-07-23 DIAGNOSIS — Z79.899 HIGH RISK MEDICATION USE: ICD-10-CM

## 2024-07-23 DIAGNOSIS — I10 HYPERTENSION, UNSPECIFIED TYPE: ICD-10-CM

## 2024-07-23 DIAGNOSIS — Z79.899 HIGH RISK MEDICATION USE: Primary | ICD-10-CM

## 2024-07-23 DIAGNOSIS — I95.1 ORTHOSTATIC HYPOTENSION: ICD-10-CM

## 2024-07-23 LAB
ANION GAP SERPL CALC-SCNC: 13 MMOL/L (ref 10–20)
BASOPHILS # BLD AUTO: 0.02 X10*3/UL (ref 0–0.1)
BASOPHILS NFR BLD AUTO: 0.2 %
BUN SERPL-MCNC: 12 MG/DL (ref 6–23)
CALCIUM SERPL-MCNC: 9.6 MG/DL (ref 8.6–10.3)
CHLORIDE SERPL-SCNC: 94 MMOL/L (ref 98–107)
CO2 SERPL-SCNC: 27 MMOL/L (ref 21–32)
CREAT SERPL-MCNC: 0.9 MG/DL (ref 0.5–1.3)
EGFRCR SERPLBLD CKD-EPI 2021: >90 ML/MIN/1.73M*2
EOSINOPHIL # BLD AUTO: 0.2 X10*3/UL (ref 0–0.7)
EOSINOPHIL NFR BLD AUTO: 1.9 %
ERYTHROCYTE [DISTWIDTH] IN BLOOD BY AUTOMATED COUNT: 12.8 % (ref 11.5–14.5)
EST. AVERAGE GLUCOSE BLD GHB EST-MCNC: 134 MG/DL
GLUCOSE SERPL-MCNC: 97 MG/DL (ref 74–99)
HBA1C MFR BLD: 6.3 %
HCT VFR BLD AUTO: 47.4 % (ref 41–52)
HGB BLD-MCNC: 16 G/DL (ref 13.5–17.5)
IMM GRANULOCYTES # BLD AUTO: 0.02 X10*3/UL (ref 0–0.7)
IMM GRANULOCYTES NFR BLD AUTO: 0.2 % (ref 0–0.9)
LYMPHOCYTES # BLD AUTO: 3.95 X10*3/UL (ref 1.2–4.8)
LYMPHOCYTES NFR BLD AUTO: 37.2 %
MCH RBC QN AUTO: 33.6 PG (ref 26–34)
MCHC RBC AUTO-ENTMCNC: 33.8 G/DL (ref 32–36)
MCV RBC AUTO: 100 FL (ref 80–100)
MONOCYTES # BLD AUTO: 1.26 X10*3/UL (ref 0.1–1)
MONOCYTES NFR BLD AUTO: 11.9 %
NEUTROPHILS # BLD AUTO: 5.16 X10*3/UL (ref 1.2–7.7)
NEUTROPHILS NFR BLD AUTO: 48.6 %
NRBC BLD-RTO: ABNORMAL /100{WBCS}
PLATELET # BLD AUTO: 313 X10*3/UL (ref 150–450)
POTASSIUM SERPL-SCNC: 4.6 MMOL/L (ref 3.5–5.3)
RBC # BLD AUTO: 4.76 X10*6/UL (ref 4.5–5.9)
SODIUM SERPL-SCNC: 129 MMOL/L (ref 136–145)
WBC # BLD AUTO: 10.6 X10*3/UL (ref 4.4–11.3)

## 2024-07-23 PROCEDURE — 36415 COLL VENOUS BLD VENIPUNCTURE: CPT

## 2024-07-23 PROCEDURE — 99214 OFFICE O/P EST MOD 30 MIN: CPT | Performed by: INTERNAL MEDICINE

## 2024-07-23 PROCEDURE — 1160F RVW MEDS BY RX/DR IN RCRD: CPT | Performed by: INTERNAL MEDICINE

## 2024-07-23 PROCEDURE — 4004F PT TOBACCO SCREEN RCVD TLK: CPT | Performed by: INTERNAL MEDICINE

## 2024-07-23 PROCEDURE — 3078F DIAST BP <80 MM HG: CPT | Performed by: INTERNAL MEDICINE

## 2024-07-23 PROCEDURE — 83036 HEMOGLOBIN GLYCOSYLATED A1C: CPT

## 2024-07-23 PROCEDURE — 1159F MED LIST DOCD IN RCRD: CPT | Performed by: INTERNAL MEDICINE

## 2024-07-23 PROCEDURE — 3074F SYST BP LT 130 MM HG: CPT | Performed by: INTERNAL MEDICINE

## 2024-07-23 RX ORDER — ATORVASTATIN CALCIUM 80 MG/1
80 TABLET, FILM COATED ORAL DAILY
Qty: 90 TABLET | Refills: 1 | Status: SHIPPED | OUTPATIENT
Start: 2024-07-23

## 2024-07-23 RX ORDER — LISINOPRIL 10 MG/1
10 TABLET ORAL DAILY
Qty: 90 TABLET | Refills: 1 | Status: SHIPPED | OUTPATIENT
Start: 2024-07-23 | End: 2024-07-23 | Stop reason: SINTOL

## 2024-07-23 ASSESSMENT — ENCOUNTER SYMPTOMS
PALPITATIONS: 0
HEADACHES: 0
WHEEZING: 0
BLOOD IN STOOL: 0
UNEXPECTED WEIGHT CHANGE: 0
SINUS PAIN: 0
COUGH: 0
ABDOMINAL PAIN: 0
DIZZINESS: 1
FATIGUE: 0
FEVER: 0
DIARRHEA: 0
SORE THROAT: 0
BRUISES/BLEEDS EASILY: 0
DIFFICULTY URINATING: 0
ARTHRALGIAS: 0

## 2024-07-23 NOTE — PROGRESS NOTES
"Subjective   Patient ID: Ozzy Hernández is a 68 y.o. male who presents for COPD, Hyperlipidemia, Results (US), and Dizziness (Patient would like to know why he gets dizzy).    - Patient comes today complaining of dizziness on standing  Blood pressure orthostatic counseled on increasing fluid intake patient need to discontinue lisinopril at this time reviewed from 4 weeks  - Obtain stat CBC BMP follow-up results closely  - Abnormal liver enzymes ALT 61 patient counseled about alcohol moderation because about alcohol liver disease  Need to proceed with liver ultrasound hemoglobin A1c  Hepatitis C screening negative  -Discussed with patient again, Cologuard positive again after multiple discussions different office visits regarding the need for colonoscopy patient aware of risk and benefit and declined the procedure aware of having cancer he is not going to do anything about it cannot afford it  Patient signed AGAINST MEDICAL ADVICE for procedure   -Abnormal CT seen by pulmonary possible infectious process awaiting repeating CT follow-up Dr. Hussein  -Chronic hypoxemia using O2 as needed per pulse ox  Nicotine dependency  \"I spent more 3 minutes counseling patient about need for smoking/tobacco cessation and how I can support efforts when patient is ready to quit.  Discussed nicotine replacement therapy, Varenicline, Bupropion, hypnosis, support groups, and accupunture as potential options.  Patient currently has no signs or symptoms of tobacco related disease.\".-   - Lung fibrosis under care of pulmonary service  - Patient had lung biopsy awaiting follow-up repeat CT follow-up lung consult for further recommendation  - COPD continue with current inhaler  -Obstructive sleep apnea counseled on sleep hygiene counseled about CPAP  -Hypercholesterolemia on Lipitor 80 mg continue monitoring follow-up lipid profile  Follow-up 4 weeks    Hyperlipidemia  Pertinent negatives include no chest pain.   Dizziness  Pertinent " "negatives include no abdominal pain, arthralgias, chest pain, congestion, coughing, fatigue, fever, headaches, rash or sore throat.          Review of Systems   Constitutional:  Negative for fatigue, fever and unexpected weight change.   HENT:  Negative for congestion, ear discharge, ear pain, mouth sores, sinus pain and sore throat.    Eyes:  Negative for visual disturbance.   Respiratory:  Negative for cough and wheezing.    Cardiovascular:  Negative for chest pain, palpitations and leg swelling.   Gastrointestinal:  Negative for abdominal pain, blood in stool and diarrhea.   Genitourinary:  Negative for difficulty urinating.   Musculoskeletal:  Negative for arthralgias.   Skin:  Negative for rash.   Neurological:  Positive for dizziness. Negative for headaches.   Hematological:  Does not bruise/bleed easily.   Psychiatric/Behavioral:  Negative for behavioral problems.    All other systems reviewed and are negative.      Objective   No results found for: \"HGBA1C\"   /60   Pulse 60   Temp 36.3 °C (97.3 °F)   Wt 78.7 kg (173 lb 9.6 oz)   SpO2 96%   BMI 28.89 kg/m²   Lab Results   Component Value Date    WBC 11.3 12/29/2023    HGB 16.2 12/29/2023    HCT 48.3 12/29/2023     12/29/2023    CHOL 112 03/19/2024    TRIG 131 03/19/2024    HDL 37.4 03/19/2024    ALT 61 (H) 03/19/2024    AST 37 03/19/2024     12/29/2023    K 4.3 12/29/2023     12/29/2023    CREATININE 0.90 12/29/2023    BUN 8 12/29/2023    CO2 27 12/29/2023    TSH 0.87 12/29/2023    PSA 0.40 12/29/2023    INR 1.1 09/21/2023     par   Physical Exam  Vitals and nursing note reviewed.   Constitutional:       Appearance: Normal appearance.   HENT:      Head: Normocephalic.      Nose: Nose normal.   Eyes:      Conjunctiva/sclera: Conjunctivae normal.      Pupils: Pupils are equal, round, and reactive to light.   Cardiovascular:      Rate and Rhythm: Regular rhythm.   Pulmonary:      Effort: Pulmonary effort is normal.      Breath " sounds: Normal breath sounds.   Abdominal:      General: Abdomen is flat.      Palpations: Abdomen is soft.   Musculoskeletal:      Cervical back: Neck supple.   Skin:     General: Skin is warm.   Neurological:      General: No focal deficit present.      Mental Status: He is oriented to person, place, and time.   Psychiatric:         Mood and Affect: Mood normal.         Assessment/Plan   Ozzy was seen today for copd, hyperlipidemia, results and dizziness.  Diagnoses and all orders for this visit:  High risk medication use (Primary)  -     CBC and Auto Differential; Future  Dizziness  -     Basic metabolic panel; Future  -     Hemoglobin A1C; Future  Orthostatic hypotension  Hypercholesteremia  -     atorvastatin (Lipitor) 80 mg tablet; Take 1 tablet (80 mg) by mouth once daily.  Hypertension, unspecified type  -     Discontinue: lisinopril 10 mg tablet; Take 1 tablet (10 mg) by mouth once daily.  Abnormal finding of blood chemistry, unspecified  -     Hemoglobin A1C; Future  Alcoholic liver disease (Multi)  Other orders  -     Follow Up In Primary Care - Established; Future   - Patient comes today complaining of dizziness on standing  Blood pressure orthostatic counseled on increasing fluid intake patient need to discontinue lisinopril at this time reviewed from 4 weeks  - Obtain stat CBC BMP follow-up results closely  - Abnormal liver enzymes ALT 61 patient counseled about alcohol moderation because about alcohol liver disease  Need to proceed with liver ultrasound hemoglobin A1c  Hepatitis C screening negative  -Discussed with patient again, Cologuard positive again after multiple discussions different office visits regarding the need for colonoscopy patient aware of risk and benefit and declined the procedure aware of having cancer he is not going to do anything about it cannot afford it  Patient signed AGAINST MEDICAL ADVICE for procedure   -Abnormal CT seen by pulmonary possible infectious process awaiting  "repeating CT follow-up Dr. Hussein  -Chronic hypoxemia using O2 as needed per pulse ox  Nicotine dependency  \"I spent more 3 minutes counseling patient about need for smoking/tobacco cessation and how I can support efforts when patient is ready to quit.  Discussed nicotine replacement therapy, Varenicline, Bupropion, hypnosis, support groups, and accupunture as potential options.  Patient currently has no signs or symptoms of tobacco related disease.\".-   - Lung fibrosis under care of pulmonary service  - Patient had lung biopsy awaiting follow-up repeat CT follow-up lung consult for further recommendation  - COPD continue with current inhaler  -Obstructive sleep apnea counseled on sleep hygiene counseled about CPAP  -Hypercholesterolemia on Lipitor 80 mg continue monitoring follow-up lipid profile  Follow-up 4 weeks  "

## 2024-08-02 ENCOUNTER — TELEPHONE (OUTPATIENT)
Dept: RADIOLOGY | Facility: HOSPITAL | Age: 69
End: 2024-08-02
Payer: MEDICARE

## 2024-08-02 NOTE — TELEPHONE ENCOUNTER
Call placed to the patient to inquire about his plan of care going forward with regard to the lung cancer program and his willingness to be treated. Message left with office number provided.

## 2024-08-22 ENCOUNTER — APPOINTMENT (OUTPATIENT)
Dept: PRIMARY CARE | Facility: CLINIC | Age: 69
End: 2024-08-22
Payer: MEDICARE

## 2024-08-22 VITALS
BODY MASS INDEX: 28.49 KG/M2 | TEMPERATURE: 97.5 F | OXYGEN SATURATION: 96 % | DIASTOLIC BLOOD PRESSURE: 78 MMHG | SYSTOLIC BLOOD PRESSURE: 120 MMHG | HEART RATE: 65 BPM | WEIGHT: 171.2 LBS

## 2024-08-22 DIAGNOSIS — I95.1 ORTHOSTATIC HYPOTENSION: ICD-10-CM

## 2024-08-22 DIAGNOSIS — R91.8 LUNG MASS: ICD-10-CM

## 2024-08-22 DIAGNOSIS — Z53.20 COLON CANCER SCREENING DECLINED: ICD-10-CM

## 2024-08-22 DIAGNOSIS — I10 HYPERTENSION, UNSPECIFIED TYPE: ICD-10-CM

## 2024-08-22 DIAGNOSIS — J96.11 CHRONIC RESPIRATORY FAILURE WITH HYPOXIA (MULTI): ICD-10-CM

## 2024-08-22 DIAGNOSIS — R73.09 ABNORMAL BLOOD SUGAR: Primary | ICD-10-CM

## 2024-08-22 DIAGNOSIS — E78.00 HYPERCHOLESTEREMIA: ICD-10-CM

## 2024-08-22 PROCEDURE — 3078F DIAST BP <80 MM HG: CPT | Performed by: INTERNAL MEDICINE

## 2024-08-22 PROCEDURE — 1160F RVW MEDS BY RX/DR IN RCRD: CPT | Performed by: INTERNAL MEDICINE

## 2024-08-22 PROCEDURE — 4004F PT TOBACCO SCREEN RCVD TLK: CPT | Performed by: INTERNAL MEDICINE

## 2024-08-22 PROCEDURE — 1159F MED LIST DOCD IN RCRD: CPT | Performed by: INTERNAL MEDICINE

## 2024-08-22 PROCEDURE — 99214 OFFICE O/P EST MOD 30 MIN: CPT | Performed by: INTERNAL MEDICINE

## 2024-08-22 PROCEDURE — 3074F SYST BP LT 130 MM HG: CPT | Performed by: INTERNAL MEDICINE

## 2024-08-22 RX ORDER — METFORMIN HYDROCHLORIDE 500 MG/1
500 TABLET ORAL
Qty: 200 TABLET | Refills: 3 | Status: SHIPPED | OUTPATIENT
Start: 2024-08-22 | End: 2025-09-26

## 2024-08-22 ASSESSMENT — ENCOUNTER SYMPTOMS
SINUS PAIN: 0
ARTHRALGIAS: 0
WHEEZING: 0
FATIGUE: 0
BLOOD IN STOOL: 0
DIZZINESS: 0
PALPITATIONS: 0
HEADACHES: 0
DIFFICULTY URINATING: 0
UNEXPECTED WEIGHT CHANGE: 0
FEVER: 0
HYPERTENSION: 1
SORE THROAT: 0
BRUISES/BLEEDS EASILY: 0
ABDOMINAL PAIN: 0
DIARRHEA: 0
COUGH: 0

## 2024-08-22 NOTE — PROGRESS NOTES
"Subjective   Patient ID: Ozzy Hernández is a 68 y.o. male who presents for Medical management (1 month follow up ), Hyperlipidemia, and Hypertension.    - Patient seen by pulmonary abnormal CT scan, abnormal PET scan underlying malignancy explained patient to findings patient need to proceed with bronchoscopy which was arranged by Dr. Hussein and patient did not follow through because there is no right patient scheduled to see a visiting nurse to arrange a ride for him  Patient to contact Dr. Hussein for new order for bronchoscopy will follow-up with patient closely as needed  - Borderline high blood sugar hemoglobin A1c 6.3 counseled about diet controlled low-carb diet start metformin 500 mg daily  - Chronic dizziness orthostasis improved after discontinuing blood pressure medication continue monitoring closely  - Hyponatremia comes about fluid restriction possible underlying lung cancer explained to patient findings need to proceed with bronchoscopy as soon as he can and lung biopsy for further recommendation will follow-up closely with results  -Discussed with patient again, Cologuard positive again after multiple discussions different office visits regarding the need for colonoscopy patient aware of risk and benefit and declined the procedure aware of having cancer he is not going to do anything about it cannot afford it  Patient signed AGAINST MEDICAL ADVICE for procedure   --Chronic hypoxemia using O2 as needed per pulse ox  Nicotine dependency  \"I spent more 3 minutes counseling patient about need for smoking/tobacco cessation and how I can support efforts when patient is ready to quit.  Discussed nicotine replacement therapy, Varenicline, Bupropion, hypnosis, support groups, and accupunture as potential options.  Patient currently has no signs or symptoms of tobacco related disease.\".-   - Lung fibrosis under care of pulmonary service  - - COPD continue with current inhaler  -Obstructive sleep " apnea counseled on sleep hygiene counseled about CPAP  -Hypercholesterolemia on Lipitor 80 mg continue monitoring follow-up lipid profile  Follow-up after lung biopsy and bronchoscopy or earlier as needed         Hyperlipidemia  Pertinent negatives include no chest pain.   Hypertension  Pertinent negatives include no chest pain, headaches or palpitations.          Review of Systems   Constitutional:  Negative for fatigue, fever and unexpected weight change.   HENT:  Negative for congestion, ear discharge, ear pain, mouth sores, sinus pain and sore throat.    Eyes:  Negative for visual disturbance.   Respiratory:  Negative for cough and wheezing.    Cardiovascular:  Negative for chest pain, palpitations and leg swelling.   Gastrointestinal:  Negative for abdominal pain, blood in stool and diarrhea.   Genitourinary:  Negative for difficulty urinating.   Musculoskeletal:  Negative for arthralgias.   Skin:  Negative for rash.   Neurological:  Negative for dizziness and headaches.   Hematological:  Does not bruise/bleed easily.   Psychiatric/Behavioral:  Negative for behavioral problems.    All other systems reviewed and are negative.      Objective   Lab Results   Component Value Date    HGBA1C 6.3 (H) 07/23/2024      /78   Pulse 65   Temp 36.4 °C (97.5 °F)   Wt 77.7 kg (171 lb 3.2 oz)   SpO2 96%   BMI 28.49 kg/m²     Physical Exam  Vitals and nursing note reviewed.   Constitutional:       Appearance: Normal appearance.   HENT:      Head: Normocephalic.      Nose: Nose normal.   Eyes:      Conjunctiva/sclera: Conjunctivae normal.      Pupils: Pupils are equal, round, and reactive to light.   Cardiovascular:      Rate and Rhythm: Regular rhythm.   Pulmonary:      Effort: Pulmonary effort is normal.      Breath sounds: Normal breath sounds.   Abdominal:      General: Abdomen is flat.      Palpations: Abdomen is soft.   Musculoskeletal:      Cervical back: Neck supple.   Skin:     General: Skin is warm.    Neurological:      General: No focal deficit present.      Mental Status: He is oriented to person, place, and time.   Psychiatric:         Mood and Affect: Mood normal.         Assessment/Plan   Ozzy was seen today for medical management, hyperlipidemia and hypertension.  Diagnoses and all orders for this visit:  Abnormal blood sugar (Primary)  -     metFORMIN (Glucophage) 500 mg tablet; Take 1 tablet (500 mg) by mouth 2 times daily (morning and late afternoon).  Lung mass  Hypertension, unspecified type  Hypercholesteremia  Orthostatic hypotension  Colon cancer screening declined  Chronic respiratory failure with hypoxia (Multi)   - Patient seen by pulmonary abnormal CT scan, abnormal PET scan underlying malignancy explained patient to findings patient need to proceed with bronchoscopy which was arranged by Dr. Hussein and patient did not follow through because there is no right patient scheduled to see a visiting nurse to arrange a ride for him  Patient to contact Dr. Hussein for new order for bronchoscopy will follow-up with patient closely as needed  - Borderline high blood sugar hemoglobin A1c 6.3 counseled about diet controlled low-carb diet start metformin 500 mg daily  - Chronic dizziness orthostasis improved after discontinuing blood pressure medication continue monitoring closely  - Hyponatremia comes about fluid restriction possible underlying lung cancer explained to patient findings need to proceed with bronchoscopy as soon as he can and lung biopsy for further recommendation will follow-up closely with results  -Discussed with patient again, Lesley positive again after multiple discussions different office visits regarding the need for colonoscopy patient aware of risk and benefit and declined the procedure aware of having cancer he is not going to do anything about it cannot afford it  Patient signed AGAINST MEDICAL ADVICE for procedure   --Chronic hypoxemia using O2 as needed per  "pulse ox  Nicotine dependency  \"I spent more 3 minutes counseling patient about need for smoking/tobacco cessation and how I can support efforts when patient is ready to quit.  Discussed nicotine replacement therapy, Varenicline, Bupropion, hypnosis, support groups, and accupunture as potential options.  Patient currently has no signs or symptoms of tobacco related disease.\".-   - Lung fibrosis under care of pulmonary service  - - COPD continue with current inhaler  -Obstructive sleep apnea counseled on sleep hygiene counseled about CPAP  -Hypercholesterolemia on Lipitor 80 mg continue monitoring follow-up lipid profile  Follow-up after lung biopsy and bronchoscopy or earlier as needed       "

## 2024-08-30 ENCOUNTER — TELEPHONE (OUTPATIENT)
Dept: PRIMARY CARE | Facility: CLINIC | Age: 69
End: 2024-08-30
Payer: MEDICARE

## 2024-08-30 NOTE — TELEPHONE ENCOUNTER
Patient called regarding Metformin, told in office and note states take daily. Bottle stated twice a day, patient was confused.     I told patient to take once a day as instructed by [facility] and office note unless he is called back with different directions

## 2025-01-30 ENCOUNTER — APPOINTMENT (OUTPATIENT)
Dept: PRIMARY CARE | Facility: CLINIC | Age: 70
End: 2025-01-30
Payer: MEDICARE

## 2025-01-30 VITALS
SYSTOLIC BLOOD PRESSURE: 126 MMHG | WEIGHT: 169.8 LBS | TEMPERATURE: 97.5 F | OXYGEN SATURATION: 92 % | DIASTOLIC BLOOD PRESSURE: 70 MMHG | BODY MASS INDEX: 28.29 KG/M2 | HEIGHT: 65 IN | HEART RATE: 70 BPM

## 2025-01-30 DIAGNOSIS — I15.9 SECONDARY HYPERTENSION: ICD-10-CM

## 2025-01-30 DIAGNOSIS — E50.9 VITAMIN A DEFICIENCY: ICD-10-CM

## 2025-01-30 DIAGNOSIS — Z00.00 ROUTINE GENERAL MEDICAL EXAMINATION AT HEALTH CARE FACILITY: Primary | ICD-10-CM

## 2025-01-30 DIAGNOSIS — J96.11 CHRONIC RESPIRATORY FAILURE WITH HYPOXIA (MULTI): ICD-10-CM

## 2025-01-30 DIAGNOSIS — I10 HYPERTENSION, UNSPECIFIED TYPE: ICD-10-CM

## 2025-01-30 DIAGNOSIS — F17.218 CIGARETTE NICOTINE DEPENDENCE WITH OTHER NICOTINE-INDUCED DISORDER: ICD-10-CM

## 2025-01-30 DIAGNOSIS — K70.9 ALCOHOLIC LIVER DISEASE: ICD-10-CM

## 2025-01-30 DIAGNOSIS — E55.9 VITAMIN D DEFICIENCY: ICD-10-CM

## 2025-01-30 DIAGNOSIS — R73.09 ABNORMAL BLOOD SUGAR: ICD-10-CM

## 2025-01-30 DIAGNOSIS — E78.00 HYPERCHOLESTEREMIA: ICD-10-CM

## 2025-01-30 DIAGNOSIS — J84.9 ILD (INTERSTITIAL LUNG DISEASE) (MULTI): ICD-10-CM

## 2025-01-30 PROCEDURE — 3078F DIAST BP <80 MM HG: CPT | Performed by: INTERNAL MEDICINE

## 2025-01-30 PROCEDURE — 1170F FXNL STATUS ASSESSED: CPT | Performed by: INTERNAL MEDICINE

## 2025-01-30 PROCEDURE — 1159F MED LIST DOCD IN RCRD: CPT | Performed by: INTERNAL MEDICINE

## 2025-01-30 PROCEDURE — G0439 PPPS, SUBSEQ VISIT: HCPCS | Performed by: INTERNAL MEDICINE

## 2025-01-30 PROCEDURE — 99397 PER PM REEVAL EST PAT 65+ YR: CPT | Performed by: INTERNAL MEDICINE

## 2025-01-30 PROCEDURE — 99214 OFFICE O/P EST MOD 30 MIN: CPT | Performed by: INTERNAL MEDICINE

## 2025-01-30 PROCEDURE — 3074F SYST BP LT 130 MM HG: CPT | Performed by: INTERNAL MEDICINE

## 2025-01-30 PROCEDURE — 1160F RVW MEDS BY RX/DR IN RCRD: CPT | Performed by: INTERNAL MEDICINE

## 2025-01-30 PROCEDURE — 3008F BODY MASS INDEX DOCD: CPT | Performed by: INTERNAL MEDICINE

## 2025-01-30 PROCEDURE — 1124F ACP DISCUSS-NO DSCNMKR DOCD: CPT | Performed by: INTERNAL MEDICINE

## 2025-01-30 RX ORDER — LISINOPRIL 10 MG/1
10 TABLET ORAL DAILY
Qty: 90 TABLET | Refills: 1 | Status: SHIPPED | OUTPATIENT
Start: 2025-01-30

## 2025-01-30 RX ORDER — LISINOPRIL 10 MG/1
TABLET ORAL
COMMUNITY
Start: 2024-11-08 | End: 2025-01-30 | Stop reason: SDUPTHER

## 2025-01-30 RX ORDER — ATORVASTATIN CALCIUM 80 MG/1
80 TABLET, FILM COATED ORAL DAILY
Qty: 90 TABLET | Refills: 1 | Status: SHIPPED | OUTPATIENT
Start: 2025-01-30

## 2025-01-30 ASSESSMENT — ENCOUNTER SYMPTOMS
SORE THROAT: 0
PALPITATIONS: 0
DIFFICULTY URINATING: 0
FEVER: 0
BRUISES/BLEEDS EASILY: 0
BLOOD IN STOOL: 0
ABDOMINAL PAIN: 0
FATIGUE: 0
WHEEZING: 0
HEADACHES: 0
DIARRHEA: 0
COUGH: 0
DIZZINESS: 0
ARTHRALGIAS: 0
UNEXPECTED WEIGHT CHANGE: 0
SINUS PAIN: 0

## 2025-01-30 ASSESSMENT — PATIENT HEALTH QUESTIONNAIRE - PHQ9
1. LITTLE INTEREST OR PLEASURE IN DOING THINGS: NOT AT ALL
2. FEELING DOWN, DEPRESSED OR HOPELESS: NOT AT ALL
SUM OF ALL RESPONSES TO PHQ9 QUESTIONS 1 AND 2: 0

## 2025-01-30 ASSESSMENT — ACTIVITIES OF DAILY LIVING (ADL)
DRESSING: INDEPENDENT
BATHING: INDEPENDENT
GROCERY_SHOPPING: INDEPENDENT
DOING_HOUSEWORK: INDEPENDENT
TAKING_MEDICATION: INDEPENDENT
MANAGING_FINANCES: INDEPENDENT

## 2025-01-30 NOTE — PROGRESS NOTES
Subjective   Reason for Visit: Ozzy Hernández is an 69 y.o. male here for a Medicare Wellness visit.          Reviewed all medications by prescribing practitioner or clinical pharmacist (such as prescriptions, OTCs, herbal therapies and supplements) and documented in the medical record.    Annual preventive visit  - Vaccination reviewed need to proceed with flu vaccine COVID-vaccine is recommended  -Screening for colon cancer history of positive Cologuard again after multiple discussions multiple office visits patient aware need to proceed with colonoscopy as recommended patient declined AGAINST MEDICAL ADVICE cannot afford it and cannot have anyone to help driving, patient at this time asymptomatic will continue monitoring closely as needed  -Screening for depression negative-  -Advance directive reviewed  - Needs to complete blood work  - Patient counseled with nicotine cessation  - Counseled about alcohol abstinence patient is to continue with 2-3 drinks a day which    Follow-up  - Pabnormal CT scan, abnormal PET scan underlying malignancy explained patient to findings patient need to proceed with bronchoscopy which was arranged by Dr. Hussein and patient did not follow through because there is no right patient scheduled to see a visiting nurse to arrange a ride for him  Patient to contact Dr. Hussein for new order for bronchoscopy will follow-up with patient closely as needed  - Borderline high blood sugar hemoglobin A1c 6.3 counseled about diet controlled low-carb diet start metformin 500 mg daily  - Chronic dizziness orthostasis improved after discontinuing blood pressure medication continue monitoring closely  - Hyponatremia comes about fluid restriction possible underlying lung cancer explained to patient findings need to proceed with bronchoscopy as soon as he can and lung biopsy for further recommendation will follow-up closely with results  -Discussed with patient again, Cologuard positive again  "after multiple discussions different office visits regarding the need for colonoscopy patient aware of risk and benefit and declined the procedure aware of having cancer he is not going to do anything about it cannot afford it  Patient signed AGAINST MEDICAL ADVICE for procedure   --Chronic hypoxemia using O2 as needed per pulse ox  Nicotine dependency  \"I spent more 3 minutes counseling patient about need for smoking/tobacco cessation and how I can support efforts when patient is ready to quit.  Discussed nicotine replacement therapy, Varenicline, Bupropion, hypnosis, support groups, and accupunture as potential options.  Patient currently has no signs or symptoms of tobacco related disease.\".-   - - COPD continue with current inhaler  -Obstructive sleep apnea counseled on sleep hygiene counseled about CPAP  -Hypercholesterolemia on Lipitor 80 mg continue monitoring follow-up lipid profile  Follow-up 3 months          Patient Care Team:  Mariajose Rebolledo MD as PCP - General  Mariajose Rebolledo MD as PCP - [employer] PCP     Review of Systems   Constitutional:  Negative for fatigue, fever and unexpected weight change.   HENT:  Negative for congestion, ear discharge, ear pain, mouth sores, sinus pain and sore throat.    Eyes:  Negative for visual disturbance.   Respiratory:  Negative for cough and wheezing.    Cardiovascular:  Negative for chest pain, palpitations and leg swelling.   Gastrointestinal:  Negative for abdominal pain, blood in stool and diarrhea.   Genitourinary:  Negative for difficulty urinating.   Musculoskeletal:  Negative for arthralgias.   Skin:  Negative for rash.   Neurological:  Negative for dizziness and headaches.   Hematological:  Does not bruise/bleed easily.   Psychiatric/Behavioral:  Negative for behavioral problems.    All other systems reviewed and are negative.      Objective   Vitals:  /70   Pulse 70   Temp 36.4 °C (97.5 °F)   Ht 1.651 m (5' 5\")   Wt 77 kg (169 " lb 12.8 oz)   SpO2 92%   BMI 28.26 kg/m²     Lab Results   Component Value Date    WBC 10.6 07/23/2024    HGB 16.0 07/23/2024    HCT 47.4 07/23/2024     07/23/2024    CHOL 112 03/19/2024    TRIG 131 03/19/2024    HDL 37.4 03/19/2024    ALT 61 (H) 03/19/2024    AST 37 03/19/2024     (L) 07/23/2024    K 4.6 07/23/2024    CL 94 (L) 07/23/2024    CREATININE 0.90 07/23/2024    BUN 12 07/23/2024    CO2 27 07/23/2024    TSH 0.87 12/29/2023    PSA 0.40 12/29/2023    INR 1.1 09/21/2023    HGBA1C 6.3 (H) 07/23/2024     par   Physical Exam  Vitals and nursing note reviewed.   Constitutional:       Appearance: Normal appearance.   HENT:      Head: Normocephalic.      Nose: Nose normal.   Eyes:      Conjunctiva/sclera: Conjunctivae normal.      Pupils: Pupils are equal, round, and reactive to light.   Cardiovascular:      Rate and Rhythm: Regular rhythm.   Pulmonary:      Effort: Pulmonary effort is normal.      Breath sounds: Normal breath sounds.   Abdominal:      General: Abdomen is flat.      Palpations: Abdomen is soft.   Musculoskeletal:      Cervical back: Neck supple.   Skin:     General: Skin is warm.   Neurological:      General: No focal deficit present.      Mental Status: He is oriented to person, place, and time.   Psychiatric:         Mood and Affect: Mood normal.         Assessment & Plan  Hypercholesteremia    Orders:    atorvastatin (Lipitor) 80 mg tablet; Take 1 tablet (80 mg) by mouth once daily.    CBC and Auto Differential; Future    Secondary hypertension    Orders:    lisinopril 10 mg tablet; Take 1 tablet (10 mg) by mouth once daily.    Routine general medical examination at health care facility    Orders:    1 Year Follow Up In Primary Care - Wellness Exam; Future    Albumin-Creatinine Ratio, Urine Random; Future    CBC and Auto Differential; Future    Comprehensive Metabolic Panel; Future    Hemoglobin A1C; Future    Lipid Panel; Future    Magnesium; Future    Prostate Specific Antigen;  Future    TSH with reflex to Free T4 if abnormal; Future    Vitamin D 25-Hydroxy,Total (for eval of Vitamin D levels); Future    Abnormal blood sugar         Hypertension, unspecified type    Orders:    CBC and Auto Differential; Future    Chronic respiratory failure with hypoxia (Multi)    Orders:    CBC and Auto Differential; Future    Alcoholic liver disease         Cigarette nicotine dependence with other nicotine-induced disorder    Orders:    CBC and Auto Differential; Future    Vitamin A deficiency         Vitamin D deficiency    Orders:    Vitamin D 25-Hydroxy,Total (for eval of Vitamin D levels); Future       Annual preventive visit  - Vaccination reviewed need to proceed with flu vaccine COVID-vaccine is recommended  -Screening for colon cancer history of positive Cologuard again after multiple discussions multiple office visits patient aware need to proceed with colonoscopy as recommended patient declined AGAINST MEDICAL ADVICE cannot afford it and cannot have anyone to help driving, patient at this time asymptomatic will continue monitoring closely as needed  -Screening for depression negative-  -Advance directive reviewed  - Needs to complete blood work  - Patient counseled with nicotine cessation  - Counseled about alcohol abstinence patient is to continue with 2-3 drinks a day which    Follow-up  - Pabnormal CT scan, abnormal PET scan underlying malignancy explained patient to findings patient need to proceed with bronchoscopy which was arranged by Dr. Hussein and patient did not follow through because there is no right patient scheduled to see a visiting nurse to arrange a ride for him  Patient to contact Dr. Hussein for new order for bronchoscopy will follow-up with patient closely as needed  - Borderline high blood sugar hemoglobin A1c 6.3 counseled about diet controlled low-carb diet start metformin 500 mg daily  - Chronic dizziness orthostasis improved after discontinuing blood pressure  "medication continue monitoring closely  - Hyponatremia comes about fluid restriction possible underlying lung cancer explained to patient findings need to proceed with bronchoscopy as soon as he can and lung biopsy for further recommendation will follow-up closely with results  -Discussed with patient again, Lesley positive again after multiple discussions different office visits regarding the need for colonoscopy patient aware of risk and benefit and declined the procedure aware of having cancer he is not going to do anything about it cannot afford it  Patient signed AGAINST MEDICAL ADVICE for procedure   --Chronic hypoxemia using O2 as needed per pulse ox  Nicotine dependency  \"I spent more 3 minutes counseling patient about need for smoking/tobacco cessation and how I can support efforts when patient is ready to quit.  Discussed nicotine replacement therapy, Varenicline, Bupropion, hypnosis, support groups, and accupunture as potential options.  Patient currently has no signs or symptoms of tobacco related disease.\".-   - - COPD continue with current inhaler  -Obstructive sleep apnea counseled on sleep hygiene counseled about CPAP  -Hypercholesterolemia on Lipitor 80 mg continue monitoring follow-up lipid profile  Follow-up 3 months         "

## 2025-01-30 NOTE — ASSESSMENT & PLAN NOTE
Orders:    atorvastatin (Lipitor) 80 mg tablet; Take 1 tablet (80 mg) by mouth once daily.    CBC and Auto Differential; Future

## 2025-01-31 LAB
25(OH)D3+25(OH)D2 SERPL-MCNC: 36 NG/ML (ref 30–100)
ALBUMIN SERPL-MCNC: 4.3 G/DL (ref 3.6–5.1)
ALP SERPL-CCNC: 93 U/L (ref 35–144)
ALT SERPL-CCNC: 39 U/L (ref 9–46)
ANION GAP SERPL CALCULATED.4IONS-SCNC: 10 MMOL/L (CALC) (ref 7–17)
AST SERPL-CCNC: 33 U/L (ref 10–35)
BASOPHILS # BLD AUTO: 36 CELLS/UL (ref 0–200)
BASOPHILS NFR BLD AUTO: 0.3 %
BILIRUB SERPL-MCNC: 0.8 MG/DL (ref 0.2–1.2)
BUN SERPL-MCNC: 11 MG/DL (ref 7–25)
CALCIUM SERPL-MCNC: 9.8 MG/DL (ref 8.6–10.3)
CHLORIDE SERPL-SCNC: 100 MMOL/L (ref 98–110)
CHOLEST SERPL-MCNC: 103 MG/DL
CHOLEST/HDLC SERPL: 2.8 (CALC)
CO2 SERPL-SCNC: 26 MMOL/L (ref 20–32)
CREAT SERPL-MCNC: 0.8 MG/DL (ref 0.7–1.35)
EGFRCR SERPLBLD CKD-EPI 2021: 97 ML/MIN/1.73M2
EOSINOPHIL # BLD AUTO: 169 CELLS/UL (ref 15–500)
EOSINOPHIL NFR BLD AUTO: 1.4 %
ERYTHROCYTE [DISTWIDTH] IN BLOOD BY AUTOMATED COUNT: 12.1 % (ref 11–15)
EST. AVERAGE GLUCOSE BLD GHB EST-MCNC: 140 MG/DL
EST. AVERAGE GLUCOSE BLD GHB EST-SCNC: 7.7 MMOL/L
GLUCOSE SERPL-MCNC: 105 MG/DL (ref 65–99)
HBA1C MFR BLD: 6.5 % OF TOTAL HGB
HCT VFR BLD AUTO: 47.6 % (ref 38.5–50)
HDLC SERPL-MCNC: 37 MG/DL
HGB BLD-MCNC: 16.3 G/DL (ref 13.2–17.1)
LDLC SERPL CALC-MCNC: 47 MG/DL (CALC)
LYMPHOCYTES # BLD AUTO: 3654 CELLS/UL (ref 850–3900)
LYMPHOCYTES NFR BLD AUTO: 30.2 %
MAGNESIUM SERPL-MCNC: 1.9 MG/DL (ref 1.5–2.5)
MCH RBC QN AUTO: 33.6 PG (ref 27–33)
MCHC RBC AUTO-ENTMCNC: 34.2 G/DL (ref 32–36)
MCV RBC AUTO: 98.1 FL (ref 80–100)
MONOCYTES # BLD AUTO: 1271 CELLS/UL (ref 200–950)
MONOCYTES NFR BLD AUTO: 10.5 %
NEUTROPHILS # BLD AUTO: 6970 CELLS/UL (ref 1500–7800)
NEUTROPHILS NFR BLD AUTO: 57.6 %
NONHDLC SERPL-MCNC: 66 MG/DL (CALC)
PLATELET # BLD AUTO: 327 THOUSAND/UL (ref 140–400)
PMV BLD REES-ECKER: 9.7 FL (ref 7.5–12.5)
POTASSIUM SERPL-SCNC: 4.7 MMOL/L (ref 3.5–5.3)
PROT SERPL-MCNC: 7.7 G/DL (ref 6.1–8.1)
PSA SERPL-MCNC: 0.33 NG/ML
RBC # BLD AUTO: 4.85 MILLION/UL (ref 4.2–5.8)
SODIUM SERPL-SCNC: 136 MMOL/L (ref 135–146)
TRIGL SERPL-MCNC: 109 MG/DL
TSH SERPL-ACNC: 0.62 MIU/L (ref 0.4–4.5)
WBC # BLD AUTO: 12.1 THOUSAND/UL (ref 3.8–10.8)

## 2025-04-30 ENCOUNTER — APPOINTMENT (OUTPATIENT)
Dept: PRIMARY CARE | Facility: CLINIC | Age: 70
End: 2025-04-30
Payer: MEDICARE

## 2025-04-30 ENCOUNTER — TELEPHONE (OUTPATIENT)
Dept: PRIMARY CARE | Facility: CLINIC | Age: 70
End: 2025-04-30

## 2025-04-30 VITALS
BODY MASS INDEX: 28.16 KG/M2 | DIASTOLIC BLOOD PRESSURE: 86 MMHG | SYSTOLIC BLOOD PRESSURE: 138 MMHG | WEIGHT: 169.2 LBS | HEART RATE: 84 BPM | TEMPERATURE: 98.7 F | OXYGEN SATURATION: 92 %

## 2025-04-30 DIAGNOSIS — E78.00 HYPERCHOLESTEREMIA: ICD-10-CM

## 2025-04-30 DIAGNOSIS — R91.8 LUNG MASS: ICD-10-CM

## 2025-04-30 DIAGNOSIS — F17.218 CIGARETTE NICOTINE DEPENDENCE WITH OTHER NICOTINE-INDUCED DISORDER: ICD-10-CM

## 2025-04-30 DIAGNOSIS — J84.9 ILD (INTERSTITIAL LUNG DISEASE) (MULTI): Primary | ICD-10-CM

## 2025-04-30 DIAGNOSIS — K70.9 ALCOHOLIC LIVER DISEASE: ICD-10-CM

## 2025-04-30 PROCEDURE — 4004F PT TOBACCO SCREEN RCVD TLK: CPT | Performed by: INTERNAL MEDICINE

## 2025-04-30 PROCEDURE — 3079F DIAST BP 80-89 MM HG: CPT | Performed by: INTERNAL MEDICINE

## 2025-04-30 PROCEDURE — G2211 COMPLEX E/M VISIT ADD ON: HCPCS | Performed by: INTERNAL MEDICINE

## 2025-04-30 PROCEDURE — 1159F MED LIST DOCD IN RCRD: CPT | Performed by: INTERNAL MEDICINE

## 2025-04-30 PROCEDURE — 99214 OFFICE O/P EST MOD 30 MIN: CPT | Performed by: INTERNAL MEDICINE

## 2025-04-30 PROCEDURE — 3075F SYST BP GE 130 - 139MM HG: CPT | Performed by: INTERNAL MEDICINE

## 2025-04-30 ASSESSMENT — ENCOUNTER SYMPTOMS
HEADACHES: 0
COUGH: 0
DIZZINESS: 0
FATIGUE: 0
SINUS PAIN: 0
FEVER: 0
PALPITATIONS: 0
SORE THROAT: 0
BRUISES/BLEEDS EASILY: 0
ABDOMINAL PAIN: 0
WHEEZING: 0
ARTHRALGIAS: 0
BLOOD IN STOOL: 0
DIARRHEA: 0
UNEXPECTED WEIGHT CHANGE: 0
DIFFICULTY URINATING: 0

## 2025-04-30 NOTE — PROGRESS NOTES
"Subjective   Patient ID: Ozzy Hernández is a 69 y.o. male who presents for Results (BW).    - Recent consultation plan of care reviewed  - Patient opted for palliative care  68 yo male with known COPD, Tobacco abuse.  Over the course of the past year he has had a few URI.  Most concerning a recent PET scan that showed uptake in RUL nodule.  He declined a broncoscopy/ bx.  Seen today in his home. Also had positive cologaurd and refused colonoscopy.  Patient doing ok physically.  Still walking, driving, eating well.  Likely malignancy. Patient doing about the same, continues to smoke.  Does not want further workup for lung nodules.  -Continue monitoring per palliative care discussed with patient DNAR patient is going to bring copy in the chart  High blood sugar continues metformin twice a day  - Chronic dizziness orthostasis improved after discontinuing blood pressure medication continue monitoring closely  - Hyponatremia comes about fluid restriction possible underlying lung cancer explained to patient findings need to proceed with bronchoscopy as soon as he can and lung biopsy for further recommendation will follow-up closely with results  -Discussed with patient again, Cologuard positive again after multiple discussions different office visits regarding the need for colonoscopy patient aware of risk and benefit and declined the procedure aware of having cancer he is not going to do anything about it cannot afford it  Patient signed AGAINST MEDICAL ADVICE for procedure   --Chronic hypoxemia using O2 as needed per pulse ox  Nicotine dependency  \"I spent more 3 minutes counseling patient about need for smoking/tobacco cessation and how I can support efforts when patient is ready to quit.  Discussed nicotine replacement therapy, Varenicline, Bupropion, hypnosis, support groups, and accupunture as potential options.  Patient currently has no signs or symptoms of tobacco related disease.\".-   - - COPD continue with " current inhaler  -Obstructive sleep apnea counseled on sleep hygiene counseled about CPAP  -Hypercholesterolemia on Lipitor 80 mg continue monitoring follow-up lipid profile  Follow-up 6 months                   Review of Systems   Constitutional:  Negative for fatigue, fever and unexpected weight change.   HENT:  Negative for congestion, ear discharge, ear pain, mouth sores, sinus pain and sore throat.    Eyes:  Negative for visual disturbance.   Respiratory:  Negative for cough and wheezing.    Cardiovascular:  Negative for chest pain, palpitations and leg swelling.   Gastrointestinal:  Negative for abdominal pain, blood in stool and diarrhea.   Genitourinary:  Negative for difficulty urinating.   Musculoskeletal:  Negative for arthralgias.   Skin:  Negative for rash.   Neurological:  Negative for dizziness and headaches.   Hematological:  Does not bruise/bleed easily.   Psychiatric/Behavioral:  Negative for behavioral problems.    All other systems reviewed and are negative.      Objective   Lab Results   Component Value Date    HGBA1C 6.5 (H) 01/30/2025      /86   Pulse 84   Temp 37.1 °C (98.7 °F)   Wt 76.7 kg (169 lb 3.2 oz)   SpO2 92%   BMI 28.16 kg/m²   Lab Results   Component Value Date    WBC 12.1 (H) 01/30/2025    HGB 16.3 01/30/2025    HCT 47.6 01/30/2025     01/30/2025    CHOL 103 01/30/2025    TRIG 109 01/30/2025    HDL 37 (L) 01/30/2025    ALT 39 01/30/2025    AST 33 01/30/2025     01/30/2025    K 4.7 01/30/2025     01/30/2025    CREATININE 0.80 01/30/2025    BUN 11 01/30/2025    CO2 26 01/30/2025    TSH 0.62 01/30/2025    PSA 0.33 01/30/2025    INR 1.1 09/21/2023    HGBA1C 6.5 (H) 01/30/2025     par   Physical Exam  Vitals and nursing note reviewed.   Constitutional:       Appearance: Normal appearance.   HENT:      Head: Normocephalic.      Nose: Nose normal.   Eyes:      Conjunctiva/sclera: Conjunctivae normal.      Pupils: Pupils are equal, round, and reactive to  light.   Cardiovascular:      Rate and Rhythm: Regular rhythm.   Pulmonary:      Effort: Pulmonary effort is normal.      Breath sounds: Normal breath sounds.   Abdominal:      General: Abdomen is flat.      Palpations: Abdomen is soft.   Musculoskeletal:      Cervical back: Neck supple.   Skin:     General: Skin is warm.   Neurological:      General: No focal deficit present.      Mental Status: He is oriented to person, place, and time.   Psychiatric:         Mood and Affect: Mood normal.         Assessment/Plan   Ozzy was seen today for results.  Diagnoses and all orders for this visit:  ILD (interstitial lung disease) (Multi) (Primary)  Hypercholesteremia  Alcoholic liver disease  Cigarette nicotine dependence with other nicotine-induced disorder  Lung mass    Recent consultation plan of care reviewed  - Patient opted for palliative care  68 yo male with known COPD, Tobacco abuse.  Over the course of the past year he has had a few URI.  Most concerning a recent PET scan that showed uptake in RUL nodule.  He declined a broncoscopy/ bx.  Seen today in his home. Also had positive cologaurd and refused colonoscopy.  Patient doing ok physically.  Still walking, driving, eating well.  Likely malignancy. Patient doing about the same, continues to smoke.  Does not want further workup for lung nodules.  -Continue monitoring per palliative care discussed with patient DNAR patient is going to bring copy in the chart  High blood sugar continues metformin twice a day  - Chronic dizziness orthostasis improved after discontinuing blood pressure medication continue monitoring closely  - Hyponatremia comes about fluid restriction possible underlying lung cancer explained to patient findings need to proceed with bronchoscopy as soon as he can and lung biopsy for further recommendation will follow-up closely with results  -Discussed with patient again, Cologuard positive again after multiple discussions different office visits  "regarding the need for colonoscopy patient aware of risk and benefit and declined the procedure aware of having cancer he is not going to do anything about it cannot afford it  Patient signed AGAINST MEDICAL ADVICE for procedure   --Chronic hypoxemia using O2 as needed per pulse ox  Nicotine dependency  \"I spent more 3 minutes counseling patient about need for smoking/tobacco cessation and how I can support efforts when patient is ready to quit.  Discussed nicotine replacement therapy, Varenicline, Bupropion, hypnosis, support groups, and accupunture as potential options.  Patient currently has no signs or symptoms of tobacco related disease.\".-   - - COPD continue with current inhaler  -Obstructive sleep apnea counseled on sleep hygiene counseled about CPAP  -Hypercholesterolemia on Lipitor 80 mg continue monitoring follow-up lipid profile  Follow-up 6 months          "

## 2025-08-06 DIAGNOSIS — E78.00 HYPERCHOLESTEREMIA: ICD-10-CM

## 2025-08-06 DIAGNOSIS — I15.9 SECONDARY HYPERTENSION: ICD-10-CM

## 2025-08-06 RX ORDER — LISINOPRIL 10 MG/1
10 TABLET ORAL DAILY
Qty: 90 TABLET | Refills: 0 | Status: SHIPPED | OUTPATIENT
Start: 2025-08-06

## 2025-08-06 RX ORDER — ATORVASTATIN CALCIUM 80 MG/1
80 TABLET, FILM COATED ORAL DAILY
Qty: 90 TABLET | Refills: 0 | Status: SHIPPED | OUTPATIENT
Start: 2025-08-06

## 2025-10-30 ENCOUNTER — APPOINTMENT (OUTPATIENT)
Dept: PRIMARY CARE | Facility: CLINIC | Age: 70
End: 2025-10-30
Payer: MEDICARE